# Patient Record
Sex: FEMALE | Race: WHITE | NOT HISPANIC OR LATINO | Employment: FULL TIME | URBAN - METROPOLITAN AREA
[De-identification: names, ages, dates, MRNs, and addresses within clinical notes are randomized per-mention and may not be internally consistent; named-entity substitution may affect disease eponyms.]

---

## 2017-03-15 ENCOUNTER — TELEPHONE (OUTPATIENT)
Dept: OBSTETRICS AND GYNECOLOGY | Facility: CLINIC | Age: 57
End: 2017-03-15

## 2017-03-15 DIAGNOSIS — Z12.31 ENCOUNTER FOR SCREENING MAMMOGRAM FOR BREAST CANCER: Primary | ICD-10-CM

## 2017-03-15 NOTE — TELEPHONE ENCOUNTER
----- Message from Cal Patel sent at 3/15/2017  1:55 PM CDT -----  Contact: Patient  x_ 1st Request  _ 2nd Request  _ 3rd Request    Who: AKSHAT ABBOTT [[id number]]    Why: Patient is calling in regards to her mmg order. Patient is needing a call back to confirm.    What Number to Call Back: Patient can be reached at [phone].    When to Expect a call back: (Before the end of the day)  -- if call after 3:00 call back will be tomorrow.

## 2017-03-23 ENCOUNTER — HOSPITAL ENCOUNTER (OUTPATIENT)
Dept: RADIOLOGY | Facility: HOSPITAL | Age: 57
Discharge: HOME | End: 2017-03-23
Attending: OBSTETRICS & GYNECOLOGY
Payer: COMMERCIAL

## 2017-03-23 DIAGNOSIS — Z12.31 ENCOUNTER FOR SCREENING MAMMOGRAM FOR BREAST CANCER: ICD-10-CM

## 2017-03-23 PROCEDURE — 77067 SCR MAMMO BI INCL CAD: CPT | Mod: 26,,, | Performed by: RADIOLOGY

## 2017-03-23 PROCEDURE — 77063 BREAST TOMOSYNTHESIS BI: CPT | Mod: 26,,, | Performed by: RADIOLOGY

## 2017-03-23 PROCEDURE — 77067 SCR MAMMO BI INCL CAD: CPT | Mod: TC

## 2017-04-22 DIAGNOSIS — N95.2 POSTMENOPAUSAL ATROPHIC VAGINITIS: ICD-10-CM

## 2017-04-22 DIAGNOSIS — Z78.0 POST-MENOPAUSAL: ICD-10-CM

## 2017-04-22 DIAGNOSIS — Z01.419 WELL WOMAN EXAM WITH ROUTINE GYNECOLOGICAL EXAM: ICD-10-CM

## 2017-04-22 DIAGNOSIS — R10.2 FEMALE PELVIC PAIN: ICD-10-CM

## 2017-04-24 RX ORDER — ESTRADIOL 10 UG/1
TABLET VAGINAL
Qty: 30 TABLET | Refills: 0 | Status: SHIPPED | OUTPATIENT
Start: 2017-04-24 | End: 2017-07-20 | Stop reason: SDUPTHER

## 2017-05-31 DIAGNOSIS — Z78.0 POST-MENOPAUSAL: ICD-10-CM

## 2017-05-31 DIAGNOSIS — Z01.419 WELL WOMAN EXAM WITH ROUTINE GYNECOLOGICAL EXAM: ICD-10-CM

## 2017-05-31 DIAGNOSIS — R10.2 FEMALE PELVIC PAIN: ICD-10-CM

## 2017-05-31 DIAGNOSIS — N95.2 POSTMENOPAUSAL ATROPHIC VAGINITIS: ICD-10-CM

## 2017-06-01 RX ORDER — ESTRADIOL 10 UG/1
TABLET VAGINAL
Qty: 180 TABLET | Refills: 0 | Status: SHIPPED | OUTPATIENT
Start: 2017-06-01 | End: 2017-07-20 | Stop reason: SDUPTHER

## 2017-06-26 ENCOUNTER — TELEPHONE (OUTPATIENT)
Dept: OBSTETRICS AND GYNECOLOGY | Facility: CLINIC | Age: 57
End: 2017-06-26

## 2017-06-26 NOTE — TELEPHONE ENCOUNTER
----- Message from Cal Patel sent at 6/26/2017  1:59 PM CDT -----  Contact: Patient  x 1st Request  _ 2nd Request  _ 3rd Request    Who: AKSHAT ABBOTT [[id number]]    Why: Patient is calling to schedule her annual appointment due to the schedule is exhausted. Patient is needing a call back    What Number to Call Back:  [phone]    When to Expect a call back: (Before the end of the day)  -- if call after 3:00 call back will be tomorrow.

## 2017-07-20 ENCOUNTER — OFFICE VISIT (OUTPATIENT)
Dept: OBSTETRICS AND GYNECOLOGY | Facility: CLINIC | Age: 57
End: 2017-07-20
Attending: OBSTETRICS & GYNECOLOGY
Payer: COMMERCIAL

## 2017-07-20 VITALS
BODY MASS INDEX: 24.13 KG/M2 | DIASTOLIC BLOOD PRESSURE: 72 MMHG | HEIGHT: 68 IN | WEIGHT: 159.19 LBS | SYSTOLIC BLOOD PRESSURE: 122 MMHG

## 2017-07-20 DIAGNOSIS — M85.80 OSTEOPENIA, UNSPECIFIED LOCATION: ICD-10-CM

## 2017-07-20 DIAGNOSIS — N95.2 POSTMENOPAUSAL ATROPHIC VAGINITIS: ICD-10-CM

## 2017-07-20 DIAGNOSIS — Z79.890 POSTMENOPAUSAL HORMONE REPLACEMENT THERAPY: ICD-10-CM

## 2017-07-20 DIAGNOSIS — R10.2 FEMALE PELVIC PAIN: ICD-10-CM

## 2017-07-20 DIAGNOSIS — Z01.419 WELL WOMAN EXAM WITH ROUTINE GYNECOLOGICAL EXAM: Primary | ICD-10-CM

## 2017-07-20 DIAGNOSIS — Z78.0 POST-MENOPAUSAL: ICD-10-CM

## 2017-07-20 PROCEDURE — 99396 PREV VISIT EST AGE 40-64: CPT | Mod: S$GLB,,, | Performed by: OBSTETRICS & GYNECOLOGY

## 2017-07-20 PROCEDURE — 99999 PR PBB SHADOW E&M-EST. PATIENT-LVL II: CPT | Mod: PBBFAC,,, | Performed by: OBSTETRICS & GYNECOLOGY

## 2017-07-20 RX ORDER — ESTRADIOL 10 UG/1
INSERT VAGINAL
Qty: 30 TABLET | Refills: 6 | Status: SHIPPED | OUTPATIENT
Start: 2017-07-20 | End: 2020-06-16

## 2017-07-20 NOTE — PROGRESS NOTES
Subjective:     Patient ID: Daksha Crabtree is a 56 y.o. female.     Chief Complaint: Gynecologic Exam     History of Present Illness: This patient is a 56 y.o. female, who presents to the GYN clinic for her gyn well woman yearly exam.  She denies gyn complaints.      No LMP recorded. Patient has had a hysterectomy.    Review of Systems    GENERAL: No fever, chills, fatigability or weightchange  SKIN: No rashes, itching or changes in color or texture of skin.  HEAD: No headaches or recent head trauma.  EYES: Visual acuity fine. No photophobia,r diplopia.  EARS: Denies earache or vertigo  NOSE: No loss of smell, no epistaxis or postnasal drip.  MOUTH & THROAT: No hoarseness or change in voice.   NODES: Denies swollen glands.  CHEST: Denies HOLT, cyanosis, wheezing, cough and sputum production.  CARDIOVASCULAR: Denies chest pain, PND, orthopnea or reduced exercise tolerance.  ABDOMEN: Appetite fine. No weight loss. bloating, Denies diarrhea, abdominal pain, hematemesis or blood in stool.  URINARY: No flank pain, dysuria or hematuria.  PERIPHERAL VASCULAR: No claudication or cyanosis.Varicosities  MUSCULOSKELETAL: No joint stiffness or swelling. Denies back pain.muscle aches  NEUROLOGIC: No history of seizures, paralysis, alteration of gait or coordination.       Objective:       Physical Exam     APPEARANCE: Well nourished, well developed, in no acute distress.    GENITOURINARY:  Vulva: No lesions. Normal female genital architecture.  Urethral Meatus: Normal size and location, no lesions, no prolapse.  Urethra: No masses, tenderness, prolapse or scarring.  Vagina: thin, atrophic and with decreased rugae, no discharge, no significant cystocele or rectocele.  Cervix: No lesions, normal diameter, no stenosis, no cervical motion tenderness. .  Uterus: 6 week size, regular shape, mobile, non-tender, normal position, good support.  Adnexa: No masses, tenderness or CDS nodularity.  Anus Perineum: No lesions, no relaxation,  no external hemorrhoids.  Breasts: Symmetrical, no skin changes or visible lesions. No palpable masses, nipple discharge or adenopathy bilaterally.  Abdomen: No masses, tenderness, hernia or ascites, no hepatasplenomegaly  Neck: Supple. Symmetric without masses. No thyromegaly.  Skin: No rashes, lesions, ulcers, acne, hirsutism.  Respiratory: Breath sounds clear bilateraly. Good air movement. No rales. No wheezes.  Cardiovascular: Normal rate. Regular rhythm.  Peripheral/lower extremities: No edema, erythema or tenderness.  Lymphatic: No axillary, neck or groin nodes palp.  Mental Status: Alert, oriented x 3, normal affect and mood.          @PROCEDURE:@           Assessment:      1. Well woman exam with routine gynecological exam    2. Post-menopausal    3. Postmenopausal atrophic vaginitis    4. Female pelvic pain    5. Osteopenia, unspecified location    6. Postmenopausal hormone replacement therapy               Plan:  1.  Bone density ordered.  2.  Discussed the pros, cons, risks, and benefits of vaginal estrogen hormone replacement therapy.  Patient wishes to continue her vaginal hormone therapy.  3.  Discussed new Pap smear recommendations patient's last Pap smear was 2016 her next Pap smear is due in 2019.  4.  Return to clinic for well woman exam,                    No orders of the defined types were placed in this encounter.

## 2018-06-04 ENCOUNTER — TELEPHONE (OUTPATIENT)
Dept: OBSTETRICS AND GYNECOLOGY | Facility: CLINIC | Age: 58
End: 2018-06-04

## 2018-06-04 DIAGNOSIS — Z12.31 VISIT FOR SCREENING MAMMOGRAM: Primary | ICD-10-CM

## 2018-06-04 NOTE — TELEPHONE ENCOUNTER
----- Message from Kimberly Clarke sent at 6/4/2018 10:00 AM CDT -----  Contact: Daksha   Name of Who is Calling: Daksha       What is the request in detail: Patient is requesting a call back concerning ordering a bone density and annual mammograms       Can the clinic reply by [facility]: No       What Number to Call Back if not in [facility]: [phone]

## 2018-06-04 NOTE — TELEPHONE ENCOUNTER
Spoke with patient, patient expressed that she wanted an order for a mammogram and a bone density test. I explained that I can put in the order for the bone density scan and once Dr. Farias signs it I can call her back and get her scheduled. Patient expressed understanding.

## 2018-06-26 ENCOUNTER — HOSPITAL ENCOUNTER (OUTPATIENT)
Dept: RADIOLOGY | Facility: HOSPITAL | Age: 58
Discharge: HOME | End: 2018-06-26
Attending: OBSTETRICS & GYNECOLOGY
Payer: COMMERCIAL

## 2018-06-26 DIAGNOSIS — Z12.31 VISIT FOR SCREENING MAMMOGRAM: ICD-10-CM

## 2018-06-26 PROCEDURE — 77063 BREAST TOMOSYNTHESIS BI: CPT | Mod: 26,,, | Performed by: RADIOLOGY

## 2018-06-26 PROCEDURE — 77067 SCR MAMMO BI INCL CAD: CPT | Mod: TC

## 2018-06-26 PROCEDURE — 77067 SCR MAMMO BI INCL CAD: CPT | Mod: 26,,, | Performed by: RADIOLOGY

## 2018-06-29 ENCOUNTER — HOSPITAL ENCOUNTER (OUTPATIENT)
Dept: RADIOLOGY | Facility: HOSPITAL | Age: 58
Discharge: HOME | End: 2018-06-29
Attending: OBSTETRICS & GYNECOLOGY
Payer: COMMERCIAL

## 2018-06-29 DIAGNOSIS — R92.8 ABNORMAL MAMMOGRAM: ICD-10-CM

## 2018-06-29 PROCEDURE — 77061 BREAST TOMOSYNTHESIS UNI: CPT | Mod: TC,PO,LT

## 2018-06-29 PROCEDURE — 76642 ULTRASOUND BREAST LIMITED: CPT | Mod: 26,LT,, | Performed by: RADIOLOGY

## 2018-06-29 PROCEDURE — 77061 BREAST TOMOSYNTHESIS UNI: CPT | Mod: 26,LT,, | Performed by: RADIOLOGY

## 2018-06-29 PROCEDURE — 76642 ULTRASOUND BREAST LIMITED: CPT | Mod: TC,PO,LT

## 2018-06-29 PROCEDURE — 77065 DX MAMMO INCL CAD UNI: CPT | Mod: TC,PO,LT

## 2018-06-29 PROCEDURE — 77065 DX MAMMO INCL CAD UNI: CPT | Mod: 26,LT,, | Performed by: RADIOLOGY

## 2018-08-16 ENCOUNTER — OFFICE VISIT (OUTPATIENT)
Dept: OBSTETRICS AND GYNECOLOGY | Facility: CLINIC | Age: 58
End: 2018-08-16
Attending: OBSTETRICS & GYNECOLOGY
Payer: COMMERCIAL

## 2018-08-16 ENCOUNTER — TELEPHONE (OUTPATIENT)
Dept: OBSTETRICS AND GYNECOLOGY | Facility: CLINIC | Age: 58
End: 2018-08-16

## 2018-08-16 VITALS
WEIGHT: 158.06 LBS | HEIGHT: 68 IN | SYSTOLIC BLOOD PRESSURE: 110 MMHG | DIASTOLIC BLOOD PRESSURE: 64 MMHG | BODY MASS INDEX: 23.95 KG/M2

## 2018-08-16 DIAGNOSIS — N94.10 FEMALE DYSPAREUNIA: ICD-10-CM

## 2018-08-16 DIAGNOSIS — M85.80 OSTEOPENIA, UNSPECIFIED LOCATION: ICD-10-CM

## 2018-08-16 DIAGNOSIS — Z78.0 POST-MENOPAUSAL: ICD-10-CM

## 2018-08-16 DIAGNOSIS — Z01.419 WELL WOMAN EXAM WITH ROUTINE GYNECOLOGICAL EXAM: Primary | ICD-10-CM

## 2018-08-16 DIAGNOSIS — Z79.890 POSTMENOPAUSAL HORMONE REPLACEMENT THERAPY: ICD-10-CM

## 2018-08-16 DIAGNOSIS — N95.2 POSTMENOPAUSAL ATROPHIC VAGINITIS: ICD-10-CM

## 2018-08-16 PROCEDURE — 99396 PREV VISIT EST AGE 40-64: CPT | Mod: S$GLB,,, | Performed by: OBSTETRICS & GYNECOLOGY

## 2018-08-16 PROCEDURE — 99999 PR PBB SHADOW E&M-EST. PATIENT-LVL III: CPT | Mod: PBBFAC,,, | Performed by: OBSTETRICS & GYNECOLOGY

## 2018-08-16 NOTE — PROGRESS NOTES
Subjective:     Patient ID: Daksha Crabtree is a 57 y.o. female.     Chief Complaint: Well Woman     History of Present Illness: This patient is a 57 y.o.  female, who presents to the GYN clinic for her gyn well woman yearly exam.  She denies gyn complaints.      No LMP recorded. Patient has had a hysterectomy.    Review of Systems    GENERAL: No fever, chills, fatigability or weightchange  SKIN: No rashes, itching or changes in color or texture of skin.  HEAD: No headaches or recent head trauma.  EYES: Visual acuity fine. No photophobia,r diplopia.  EARS: Denies earache or vertigo  NOSE: No loss of smell, no epistaxis or postnasal drip.  MOUTH & THROAT: No hoarseness or change in voice.   NODES: Denies swollen glands.  CHEST: Denies HOLT, cyanosis, wheezing, cough and sputum production.  CARDIOVASCULAR: Denies chest pain, PND, orthopnea or reduced exercise tolerance.  ABDOMEN: Appetite fine. No weight loss. bloating, Denies diarrhea, abdominal pain, hematemesis or blood in stool.  URINARY: No flank pain, dysuria or hematuria.  PERIPHERAL VASCULAR: No claudication or cyanosis.Varicosities  MUSCULOSKELETAL: No joint stiffness or swelling. Denies back pain.muscle aches  NEUROLOGIC: No history of seizures, paralysis, alteration of gait or coordination.       Objective:       Physical Exam     APPEARANCE: Well nourished, well developed, in no acute distress.    GENITOURINARY:  Vulva: No lesions. Normal female genital architecture.  Urethral Meatus: Normal size and location, no lesions, no prolapse.  Urethra: No masses, tenderness, prolapse or scarring.  Vagina: thin, atrophic and with decreased rugae, no discharge, no significant cystocele or rectocele.  Cervix: No lesions, normal diameter, no stenosis, no cervical motion tenderness.   Uterus: Absent  Adnexa: No masses, tenderness or CDS nodularity.  Anus Perineum: No lesions, no relaxation, no external hemorrhoids.  Breasts: Symmetrical, no skin changes or visible  lesions. No palpable masses, nipple discharge or adenopathy bilaterally. Bilateral implants noted.  Abdomen: No masses, tenderness, hernia or ascites, no hepatasplenomegaly  Neck: Supple. Symmetric without masses. No thyromegaly.  Skin: No rashes, lesions, ulcers, acne, hirsutism.  Respiratory: Breath sounds clear bilateraly. Good air movement. No rales. No wheezes.  Cardiovascular: Normal rate. Regular rhythm.  Peripheral/lower extremities: No edema, erythema or tenderness.  Lymphatic: No axillary, neck or groin nodes palp.  Mental Status: Alert, oriented x 3, normal affect and mood.    @PROCEDURE:@           Assessment:      1. Well woman exam with routine gynecological exam    2. Post-menopausal    3. Postmenopausal atrophic vaginitis    4. Postmenopausal vaginal hormone replacement therapy    5. Female dyspareunia    6. Osteopenia, unspecified location               Plan:  1.  Discussed the advantages of vaginal estrogen hormone replacement to help thicken the vaginal mucosa and decrease dyspareunia.  2.  No change in gyn regimen.  3.  Discussed Pap smear recommendations.  Needs Pap smear in 2019.  4.  Return to clinic for well-woman exam.              No orders of the defined types were placed in this encounter.

## 2018-08-16 NOTE — TELEPHONE ENCOUNTER
Pt states she may go back on using Yuvafem, ok to use daily for one week then go back to twice weekly per Dr Farias. Pt will have pharm call if refills needed.

## 2018-08-16 NOTE — TELEPHONE ENCOUNTER
----- Message from Debi Hernandez sent at 8/16/2018  4:42 PM CDT -----  Contact: self  Call back number is [phone]. She has some questions about some meds.

## 2018-08-31 ENCOUNTER — HOSPITAL ENCOUNTER (OUTPATIENT)
Dept: RADIOLOGY | Facility: CLINIC | Age: 58
Discharge: HOME | End: 2018-08-31
Attending: OBSTETRICS & GYNECOLOGY
Payer: COMMERCIAL

## 2018-08-31 DIAGNOSIS — Z78.0 POST-MENOPAUSAL: ICD-10-CM

## 2018-08-31 DIAGNOSIS — M85.80 OSTEOPENIA, UNSPECIFIED LOCATION: ICD-10-CM

## 2018-08-31 DIAGNOSIS — Z12.31 ENCOUNTER FOR SCREENING MAMMOGRAM FOR BREAST CANCER: ICD-10-CM

## 2018-08-31 PROCEDURE — 77080 DXA BONE DENSITY AXIAL: CPT | Mod: 26,,, | Performed by: INTERNAL MEDICINE

## 2018-08-31 PROCEDURE — 77080 DXA BONE DENSITY AXIAL: CPT | Mod: TC

## 2019-01-30 ENCOUNTER — TELEPHONE (OUTPATIENT)
Dept: OBSTETRICS AND GYNECOLOGY | Facility: CLINIC | Age: 59
End: 2019-01-30

## 2019-01-30 NOTE — TELEPHONE ENCOUNTER
"States having some swelling of left labia and feels a "bump" denies any pain, states just uncomfortable when touched. Explained Dr Farias not in clinic this pm, offered appt with another provider. States can not come in today, requesting appt Friday, appt scheduled  "

## 2019-01-30 NOTE — TELEPHONE ENCOUNTER
----- Message from Kimberly Clarke sent at 1/30/2019 12:19 PM CST -----  Contact: musa  Name of Who is Calling: musa      What is the request in detail: Patient is requesting a call back she states she is having a issue but would not discuss her issues       Can the clinic reply by [facility]: no      What Number to Call Back if not in [facility]: [phone]

## 2019-02-01 ENCOUNTER — OFFICE VISIT (OUTPATIENT)
Dept: OBSTETRICS AND GYNECOLOGY | Facility: CLINIC | Age: 59
End: 2019-02-01
Payer: COMMERCIAL

## 2019-02-01 VITALS
BODY MASS INDEX: 23.56 KG/M2 | HEIGHT: 68 IN | WEIGHT: 155.44 LBS | SYSTOLIC BLOOD PRESSURE: 106 MMHG | DIASTOLIC BLOOD PRESSURE: 70 MMHG

## 2019-02-01 DIAGNOSIS — N89.8 VAGINAL IRRITATION: Primary | ICD-10-CM

## 2019-02-01 PROCEDURE — 99213 PR OFFICE/OUTPT VISIT, EST, LEVL III, 20-29 MIN: ICD-10-PCS | Mod: S$GLB,,, | Performed by: OBSTETRICS & GYNECOLOGY

## 2019-02-01 PROCEDURE — 87510 GARDNER VAG DNA DIR PROBE: CPT

## 2019-02-01 PROCEDURE — 99213 OFFICE O/P EST LOW 20 MIN: CPT | Mod: S$GLB,,, | Performed by: OBSTETRICS & GYNECOLOGY

## 2019-02-01 PROCEDURE — 87480 CANDIDA DNA DIR PROBE: CPT

## 2019-02-01 PROCEDURE — 3008F PR BODY MASS INDEX (BMI) DOCUMENTED: ICD-10-PCS | Mod: CPTII,S$GLB,, | Performed by: OBSTETRICS & GYNECOLOGY

## 2019-02-01 PROCEDURE — 3008F BODY MASS INDEX DOCD: CPT | Mod: CPTII,S$GLB,, | Performed by: OBSTETRICS & GYNECOLOGY

## 2019-02-01 RX ORDER — AMOXICILLIN AND CLAVULANATE POTASSIUM 875; 125 MG/1; MG/1
TABLET, FILM COATED ORAL
Refills: 0 | COMMUNITY
Start: 2019-01-21 | End: 2019-08-22

## 2019-02-01 RX ORDER — FLUCONAZOLE 150 MG/1
150 TABLET ORAL ONCE
Qty: 1 TABLET | Refills: 0 | Status: SHIPPED | OUTPATIENT
Start: 2019-02-01 | End: 2019-02-01

## 2019-02-01 NOTE — PROGRESS NOTES
"CC: Vaginal irritation and "bump"    Daksha Crabtree is a 58 y.o. female  presents with complaint of vaginal irritation and "vaginal bump". She noticed this on Wednesday. She is still taking vaginal estrogen as prescribed.    Past Medical History:   Diagnosis Date    Breast disorder     Dyspareunia 3/6/2014     Past Surgical History:   Procedure Laterality Date    BREAST BIOPSY Left 2010    BREAST SURGERY Bilateral     CHOLECYSTECTOMY      HYSTERECTOMY      LASER LAPAROSCOPY      Supracervical hysterectomy N/A     cervix  no uterus    TONSILLECTOMY       Social History     Tobacco Use    Smoking status: Former Smoker   Substance Use Topics    Alcohol use: Yes     Comment: Soically    Drug use: No     Family History   Problem Relation Age of Onset    Colon cancer Paternal Grandfather     Colon cancer Maternal Grandmother     Breast cancer Mother     Breast cancer Maternal Aunt     Ovarian cancer Neg Hx      OB History    Para Term  AB Living   2 2 2     2   SAB TAB Ectopic Multiple Live Births           2      # Outcome Date GA Lbr Joon/2nd Weight Sex Delivery Anes PTL Lv   2 Term      Vag-Spont  N ALVA   1 Term      Vag-Spont  N ALVA          /70 (BP Location: Left arm, Patient Position: Sitting)   Ht 5' 8" (1.727 m)   Wt 70.5 kg (155 lb 6.8 oz)   BMI 23.63 kg/m²     ROS:  GENERAL: No fever, chills, fatigability or weight loss.  VULVAR: No pain, + lesion and + itching.  VAGINAL: No relaxation, no itching, no discharge, no abnormal bleeding and no lesions.  ABDOMEN: No abdominal pain. Denies nausea. Denies vomiting. No diarrhea. No constipation  BREAST: Denies pain. No lumps. No discharge.  URINARY: No incontinence, no nocturia, no frequency and no dysuria.  CARDIOVASCULAR: No chest pain. No shortness of breath. No leg cramps.  NEUROLOGICAL: No headaches. No vision changes.    PHYSICAL EXAM:  GEN: NAD  Resp: nl effort  Pelvic:  VULVA: irritated appearing vulva with no " "tenderness or lesions, inflammed LN palpated left vulva, .5mm  Skin: warm and dry  Psych: nl affect  Neuro: no focal deficits    ASSESSMENT and PLAN:    ICD-10-CM ICD-9-CM    1. Vaginal irritation N89.8 623.9 Vaginosis Screen by DNA Probe       -Rx given for Diflucan for suspected VVC. "Bump" appears to be lymphadenopathy due to inflammation. All questions answered.       FOLLOW UP: PRN lack of improvement.           "

## 2019-02-02 LAB
CANDIDA RRNA VAG QL PROBE: NEGATIVE
G VAGINALIS RRNA GENITAL QL PROBE: NEGATIVE
T VAGINALIS RRNA GENITAL QL PROBE: NEGATIVE

## 2019-02-04 ENCOUNTER — PATIENT MESSAGE (OUTPATIENT)
Dept: OBSTETRICS AND GYNECOLOGY | Facility: CLINIC | Age: 59
End: 2019-02-04

## 2019-03-19 ENCOUNTER — NUTRITION (OUTPATIENT)
Dept: NUTRITION | Facility: CLINIC | Age: 59
End: 2019-03-19

## 2019-03-19 DIAGNOSIS — Z71.3 NUTRITIONAL COUNSELING: Primary | ICD-10-CM

## 2019-03-19 PROCEDURE — 97802 PR MED NUTR THER, 1ST, INDIV, EA 15 MIN: ICD-10-PCS | Mod: CSM,S$GLB,, | Performed by: NUTRITIONIST

## 2019-03-19 PROCEDURE — 97802 MEDICAL NUTRITION INDIV IN: CPT | Mod: CSM,S$GLB,, | Performed by: NUTRITIONIST

## 2019-03-19 NOTE — PROGRESS NOTES
"Nutrition Assessment for Initial Medical Nutrition Therapy    Referring professional: self referral    Reason for MNT visit: Pt in for education and nutrition counseling regarding nutritional counseling for goal of body fat loss while doing the keto diet. Her goal weight is 145 lbs. Since she started keto 5 weeks ago, her cholesterol has increased.       ASSESSMENT    Age: 58 y.o.  Wt: 155 lbs  Wt Readings from Last 1 Encounters:   02/01/19 70.5 kg (155 lb 6.8 oz)     Ht: 5'8"  Ht Readings from Last 1 Encounters:   02/01/19 5' 8" (1.727 m)     BMI: 23.56  BMI Readings from Last 1 Encounters:   02/01/19 23.63 kg/m²       Clinical signs/symptoms: none to assess    Medical History:   Past Medical History:   Diagnosis Date    Breast disorder     Dyspareunia 3/6/2014     Problem List           Resolved    Postmenopausal atrophic vaginitis         Postmenopausal vaginal hormone replacement therapy         Osteopenia         Female dyspareunia               Medications:none  Supplements: Vitamin D + calcium (combined) twice a day; 1,000 mg Vitamin C; milk thistle; biotin; MVI centrum trudy    Food/medication interactions: Patient stated she isn't currently taking any meds    Food allergies  intolerances: NKFA    Labs:  Reviewed. Her last on 2/26/2019 from her doctor are as follows: Total chol: 244; HDL: 66; ;  and BS is 103.  No results found for: HGBA1C  Cholesterol   Date Value Ref Range Status   03/25/2009 169 120 - 199 mg/dL Final     Comment:     The National Cholesterol Education Program (NCEP) has set the  following guidelines (reference ranges) for Cholesterol:  Optimal.....................<200 mg/dL  Borderline High.............200-239 mg/dL  High........................> or = 240 mg/dL     03/16/2007 158 120 - 199 mg/dL Final     Comment:     The National Cholesterol Education Program (NCEP) has set the  following guidelines (reference ranges) for Cholesterol:  Desirable...................<200 " mg/dL  Borderline High.............200-239 mg/dL  High........................> or = 240 mg/dL       Triglycerides   Date Value Ref Range Status   03/25/2009 108 30 - 150 mg/dL Final     Comment:     The National Cholesterol Education Program (NCEP) has set the  following guidelines (reference values) for triglycerides:  Normal......................<150 mg/dL  Borderline High.............150-199 mg/dL  High........................200-499 mg/dL  Very High...................> or = 500 mg/dL     03/16/2007 122 30 - 150 mg/dL Final     Comment:     The National Cholesterol Education Program (NCEP) has set the  following guidelines (reference values) for triglycerides:  Normal......................<150 mg/dL  Borderline High.............150-199 mg/dL  High........................200-499 mg/dL  Very High...................> or = 500 mg/dL       HDL   Date Value Ref Range Status   03/25/2009 38 (L) 40 - 75 mg/dL Final     Comment:     The National Cholesterol Education Program (NCEP) has set the  following guidelines (reference values) for HDL Cholesterol:  Low...............<40  Optimal...........>60     03/16/2007 33 (L) 40 - 67 mg/dL Final     Comment:     The National Cholesterol Education Program (NCEP) has set the  following guidelines (reference values) for HDL Cholesterol:  Low...............<40  Optimal...........>60       LDL Cholesterol   Date Value Ref Range Status   03/25/2009 109.4 63 - 129 mg/dl Final     Comment:     The National Cholesterol Education Program (NCEP) has set the  following guidelines (reference values) for LDL Cholesterol:  Optimal.......................<130 mg/dL  Borderline High...............130-159 mg/dL  High..........................160-189 mg/dL  Very High.....................>190 mg/dL     03/16/2007 100.6 mg/dl Final     Comment:     The National Cholesterol Education Program (NCEP) has set the  following guidelines (reference values) for LDL  Cholesterol:  Desirable.....................<130 mg/dL  Borderline High...............130-159 mg/dL  High..........................> or = 160 mg/dL         Typical day recall: Reviewed during consult. She consumes a good bit of high fat meats, which can increase her LDL. She does great at home with keto, but we discovered hidden sugar/carbs for her meals at restaurants.   Beverages: Coffee: 1-2 cups per day with 1 tsp creamer; water: 8-10 (16.9 oz bottle water) per day.    Dining out: Regular, 6 or more times per week    Alcohol: nonsmoker; drinks 2-3 glasses of wine ~5 days per week    Lifestyle Influences  Support System: Her     Meal preparation/shopping: self, spouse    Current Activity Level: Exercises 5-7 days a week for one hour; 30 minutes of cardio and 30 minutes of light weights  Patient motivation, anticipated barriers, expected compliance: Patient is motivated and has verbalized understanding and intent to comply    DIAGNOSIS   Problem: Food knowledge deficit  Etiology: related to eating more than rec higher animal based fat meats + hidden calories at restaurants  Signs/Symptoms: as evidence by 24 hour recall    INTERVENTION  Nutrition prescription: estimated energy requirements:   1500 calories  130 grams protein  ~30 grams carbohydrates   95 grams heart healthy fats  Fluid: 75 oz + sweat loss      Recommendations & Goals:  Patient goals and recommendations are tailored to the specific patient's needs, readiness to change, lifestyle, culture, skills, resources, & abilities. Strategies to help achieve these nutrition-related goals were discussed which can include but are not limited to SMART goal setting & mindful eating.     Aim for a minimum of 7 hours sleep   Exercise 60 minutes most days  Eat breakfast within 1-2 hours of waking up  Try not to skip any meals or snacks, not going more than 3-4 hours without eating.   At each meal and snack, try to include a source of fiber + lean protein +  healthy fat.   Consider obtaining 15-20 grams of carbohydrates per meal and snack with the exception of dinner time.     Written Materials Provided  These resources are intended to assist the patient in making it easier to choose recommended options when eating out & to identify better-for-you brands at the grocery store:     Meal Planning Guide with recommendations discussed along with portion sizes and a customized meal plan    Eat Fit jessica card as a reminder to download the jessica to ones smart phone which provides: current Eat Fit partners, approved menu options, food labels for carb counting, & recipes    Fast Food Lite Guide   Eat Fit Grocery Product list    RD contact information- for patient to contact regarding any questions, needs, and/or concerns that may arise     MONITORING & EVALUATION    Communicated with healthcare provider: n/a    Documented plan for referral to appropriate agency/healthcare provider as needed: n/a    Comprehension: fair     Motivation to change: high    Follow-up: 2 weeks  Counseling time: 2 Hours

## 2019-04-09 ENCOUNTER — NUTRITION (OUTPATIENT)
Dept: NUTRITION | Facility: CLINIC | Age: 59
End: 2019-04-09

## 2019-04-09 DIAGNOSIS — Z71.3 NUTRITIONAL COUNSELING: Primary | ICD-10-CM

## 2019-04-09 PROCEDURE — 99499 NO LOS: ICD-10-PCS | Mod: CSM,S$GLB,, | Performed by: NUTRITIONIST

## 2019-04-09 PROCEDURE — 99499 UNLISTED E&M SERVICE: CPT | Mod: CSM,S$GLB,, | Performed by: NUTRITIONIST

## 2019-04-09 NOTE — PROGRESS NOTES
"Nutrition Assessment for Initial Medical Nutrition Therapy     Referring professional: self referral     Reason for MNT visit: Pt in for education and nutrition counseling regarding nutritional counseling for goal of body fat loss while doing the keto diet. I met with Daksha 3 weeks ago. Her clothes are fitting looser and is happy with her meal plan. I recommended a few new products.       ASSESSMENT     Age: 58 y.o.  Wt: ~150 pounds.       Wt Readings from Last 1 Encounters:   02/01/19 70.5 kg (155 lb 6.8 oz)      Ht: 5'8"      Ht Readings from Last 1 Encounters:   02/01/19 5' 8" (1.727 m)      BMI: 22.7      BMI Readings from Last 1 Encounters:   02/01/19 23.63 kg/m²         Clinical signs/symptoms: none to assess     Medical History:        Past Medical History:   Diagnosis Date    Breast disorder      Dyspareunia 3/6/2014              Problem List            Resolved     Postmenopausal atrophic vaginitis             Postmenopausal vaginal hormone replacement therapy             Osteopenia             Female dyspareunia                     Medications:none  Supplements: Vitamin D + calcium (combined) twice a day; 1,000 mg Vitamin C; milk thistle; biotin; MVI centrum trudy     Food/medication interactions: Patient stated she isn't currently taking any meds     Food allergies  intolerances: NKFA     Labs:  Reviewed. Her last on 2/26/2019 from her doctor are as follows: Total chol: 244; HDL: 66; ;  and BS is 103.  No results found for: HGBA1C          Cholesterol   Date Value Ref Range Status   03/25/2009 169 120 - 199 mg/dL Final       Comment:       The National Cholesterol Education Program (NCEP) has set the  following guidelines (reference ranges) for Cholesterol:  Optimal.....................<200 mg/dL  Borderline High.............200-239 mg/dL  High........................> or = 240 mg/dL      03/16/2007 158 120 - 199 mg/dL Final       Comment:       The National Cholesterol Education Program " (NCEP) has set the  following guidelines (reference ranges) for Cholesterol:  Desirable...................<200 mg/dL  Borderline High.............200-239 mg/dL  High........................> or = 240 mg/dL                 Triglycerides   Date Value Ref Range Status   03/25/2009 108 30 - 150 mg/dL Final       Comment:       The National Cholesterol Education Program (NCEP) has set the  following guidelines (reference values) for triglycerides:  Normal......................<150 mg/dL  Borderline High.............150-199 mg/dL  High........................200-499 mg/dL  Very High...................> or = 500 mg/dL      03/16/2007 122 30 - 150 mg/dL Final       Comment:       The National Cholesterol Education Program (NCEP) has set the  following guidelines (reference values) for triglycerides:  Normal......................<150 mg/dL  Borderline High.............150-199 mg/dL  High........................200-499 mg/dL  Very High...................> or = 500 mg/dL                 HDL   Date Value Ref Range Status   03/25/2009 38 (L) 40 - 75 mg/dL Final       Comment:       The National Cholesterol Education Program (NCEP) has set the  following guidelines (reference values) for HDL Cholesterol:  Low...............<40  Optimal...........>60      03/16/2007 33 (L) 40 - 67 mg/dL Final       Comment:       The National Cholesterol Education Program (NCEP) has set the  following guidelines (reference values) for HDL Cholesterol:  Low...............<40  Optimal...........>60                 LDL Cholesterol   Date Value Ref Range Status   03/25/2009 109.4 63 - 129 mg/dl Final       Comment:       The National Cholesterol Education Program (NCEP) has set the  following guidelines (reference values) for LDL Cholesterol:  Optimal.......................<130 mg/dL  Borderline High...............130-159 mg/dL  High..........................160-189 mg/dL  Very High.....................>190 mg/dL      03/16/2007 100.6 mg/dl Final        Comment:       The National Cholesterol Education Program (NCEP) has set the  following guidelines (reference values) for LDL Cholesterol:  Desirable.....................<130 mg/dL  Borderline High...............130-159 mg/dL  High..........................> or = 160 mg/dL            Typical day recall: Reviewed during consult. She consumes a good bit of high fat meats, which can increase her LDL. She does great at home with keto, but we discovered hidden sugar/carbs for her meals at restaurants.   Beverages: Coffee: 1-2 cups per day with 1 tsp creamer; water: 8-10 (16.9 oz bottle water) per day.     Dining out: Regular, 6 or more times per week     Alcohol: nonsmoker; drinks 2-3 glasses of wine ~5 days per week     Lifestyle Influences  Support System: Her      Meal preparation/shopping: self, spouse     Current Activity Level: Exercises 5-7 days a week for one hour; 30 minutes of cardio and 30 minutes of light weights  Patient motivation, anticipated barriers, expected compliance: Patient is motivated and has verbalized understanding and intent to comply     DIAGNOSIS   Problem: Food knowledge deficit  Etiology: related to eating more than rec higher animal based fat meats + hidden calories at restaurants  Signs/Symptoms: as evidence by 24 hour recall     INTERVENTION  Nutrition prescription: estimated energy requirements:   1500 calories  130 grams protein  ~30 grams carbohydrates   95 grams heart healthy fats  Fluid: 75 oz + sweat loss        Recommendations & Goals:  Patient goals and recommendations are tailored to the specific patient's needs, readiness to change, lifestyle, culture, skills, resources, & abilities. Strategies to help achieve these nutrition-related goals were discussed which can include but are not limited to SMART goal setting & mindful eating.                 Aim for a minimum of 7 hours sleep              Exercise 60 minutes most days  Eat breakfast within 1-2 hours of waking  up  Try not to skip any meals or snacks, not going more than 3-4 hours without eating.   At each meal and snack, try to include a source of fiber + lean protein + healthy fat.   Consider obtaining 15-20 grams of carbohydrates per meal and snack with the exception of dinner time.      Written Materials Provided  These resources are intended to assist the patient in making it easier to choose recommended options when eating out & to identify better-for-you brands at the grocery store:     · Meal Planning Guide with recommendations discussed along with portion sizes and a customized meal plan   · Eat Fit jessica card as a reminder to download the jessica to ones smart phone which provides: current Eat Fit partners, approved menu options, food labels for carb counting, & recipes   · Fast Food Lite Guide  · Eat [employer] Grocery Product list   · RD contact information- for patient to contact regarding any questions, needs, and/or concerns that may arise      MONITORING & EVALUATION     Communicated with healthcare provider: n/a     Documented plan for referral to appropriate agency/healthcare provider as needed: n/a     Comprehension: fair      Motivation to change: high     Follow-up: 2 weeks  Counseling time: 2 Hours

## 2019-07-09 ENCOUNTER — TELEPHONE (OUTPATIENT)
Dept: OBSTETRICS AND GYNECOLOGY | Facility: CLINIC | Age: 59
End: 2019-07-09

## 2019-07-09 DIAGNOSIS — Z12.31 SCREENING MAMMOGRAM, ENCOUNTER FOR: Primary | ICD-10-CM

## 2019-07-23 ENCOUNTER — HOSPITAL ENCOUNTER (OUTPATIENT)
Dept: RADIOLOGY | Facility: HOSPITAL | Age: 59
Discharge: HOME | End: 2019-07-23
Attending: OBSTETRICS & GYNECOLOGY
Payer: COMMERCIAL

## 2019-07-23 DIAGNOSIS — Z12.31 SCREENING MAMMOGRAM, ENCOUNTER FOR: ICD-10-CM

## 2019-07-23 PROCEDURE — 77067 MAMMO DIGITAL SCREENING BILAT WITH TOMOSYNTHESIS_CAD: ICD-10-PCS | Mod: 26,,, | Performed by: RADIOLOGY

## 2019-07-23 PROCEDURE — 77067 SCR MAMMO BI INCL CAD: CPT | Mod: 26,,, | Performed by: RADIOLOGY

## 2019-07-23 PROCEDURE — 77067 SCR MAMMO BI INCL CAD: CPT | Mod: TC

## 2019-07-23 PROCEDURE — 77063 BREAST TOMOSYNTHESIS BI: CPT | Mod: 26,,, | Performed by: RADIOLOGY

## 2019-07-23 PROCEDURE — 77063 MAMMO DIGITAL SCREENING BILAT WITH TOMOSYNTHESIS_CAD: ICD-10-PCS | Mod: 26,,, | Performed by: RADIOLOGY

## 2019-08-22 ENCOUNTER — OFFICE VISIT (OUTPATIENT)
Dept: OBSTETRICS AND GYNECOLOGY | Facility: CLINIC | Age: 59
End: 2019-08-22
Attending: OBSTETRICS & GYNECOLOGY
Payer: COMMERCIAL

## 2019-08-22 VITALS
WEIGHT: 149.5 LBS | DIASTOLIC BLOOD PRESSURE: 62 MMHG | HEIGHT: 68 IN | SYSTOLIC BLOOD PRESSURE: 116 MMHG | BODY MASS INDEX: 22.66 KG/M2

## 2019-08-22 DIAGNOSIS — Z79.890 POSTMENOPAUSAL HORMONE REPLACEMENT THERAPY: ICD-10-CM

## 2019-08-22 DIAGNOSIS — Z12.31 SCREENING MAMMOGRAM, ENCOUNTER FOR: ICD-10-CM

## 2019-08-22 DIAGNOSIS — N95.2 POSTMENOPAUSAL ATROPHIC VAGINITIS: ICD-10-CM

## 2019-08-22 DIAGNOSIS — Z01.419 WELL WOMAN EXAM WITH ROUTINE GYNECOLOGICAL EXAM: Primary | ICD-10-CM

## 2019-08-22 DIAGNOSIS — Z78.0 POST-MENOPAUSAL: ICD-10-CM

## 2019-08-22 PROCEDURE — 99999 PR PBB SHADOW E&M-EST. PATIENT-LVL III: CPT | Mod: PBBFAC,,, | Performed by: OBSTETRICS & GYNECOLOGY

## 2019-08-22 PROCEDURE — 99999 PR PBB SHADOW E&M-EST. PATIENT-LVL III: ICD-10-PCS | Mod: PBBFAC,,, | Performed by: OBSTETRICS & GYNECOLOGY

## 2019-08-22 PROCEDURE — 99396 PR PREVENTIVE VISIT,EST,40-64: ICD-10-PCS | Mod: S$GLB,,, | Performed by: OBSTETRICS & GYNECOLOGY

## 2019-08-22 PROCEDURE — 88142 CYTOPATH C/V THIN LAYER: CPT

## 2019-08-22 PROCEDURE — 99396 PREV VISIT EST AGE 40-64: CPT | Mod: S$GLB,,, | Performed by: OBSTETRICS & GYNECOLOGY

## 2019-08-22 RX ORDER — ESTRADIOL 10 UG/1
INSERT VAGINAL
COMMUNITY
Start: 2018-12-12 | End: 2019-08-22 | Stop reason: SDUPTHER

## 2019-08-22 RX ORDER — ESTRADIOL 10 UG/1
INSERT VAGINAL
Qty: 30 TABLET | Refills: 6 | Status: SHIPPED | OUTPATIENT
Start: 2019-08-22 | End: 2019-12-18 | Stop reason: SDUPTHER

## 2019-08-22 RX ORDER — LORAZEPAM 0.5 MG/1
TABLET ORAL
Refills: 3 | COMMUNITY
Start: 2019-06-03 | End: 2022-09-01

## 2019-08-22 RX ORDER — LORAZEPAM 0.5 MG/1
0.5 TABLET ORAL
COMMUNITY
Start: 2019-02-25

## 2019-08-22 RX ORDER — FLUOROURACIL 50 MG/G
CREAM TOPICAL
COMMUNITY
Start: 2019-08-15 | End: 2022-09-01

## 2019-08-22 NOTE — PROGRESS NOTES
Subjective:     Patient ID: Daksha Crabtree is a 58 y.o. female.     Chief Complaint: Well Woman     History of Present Illness: This patient is a 58 y.o.  female, who presents to the GYN clinic for her gyn well woman yearly exam.  She continues to use vaginal estrogen hormone replacement therapy. She denies gyn complaints      No LMP recorded. Patient has had a hysterectomy.    Review of Systems    GENERAL: No fever, chills, fatigability or weightchange  SKIN: No rashes, itching or changes in color or texture of skin.  HEAD: No headaches or recent head trauma.  EYES: Visual acuity fine. No photophobia,r diplopia.  EARS: Denies earache or vertigo  NOSE: No loss of smell, no epistaxis or postnasal drip.  MOUTH & THROAT: No hoarseness or change in voice.   NODES: Denies swollen glands.  CHEST: Denies HOLT, cyanosis, wheezing, cough and sputum production.  CARDIOVASCULAR: Denies chest pain, PND, orthopnea or reduced exercise tolerance.  ABDOMEN: Appetite fine. No weight loss. bloating, Denies diarrhea, abdominal pain, hematemesis or blood in stool.  URINARY: No flank pain, dysuria or hematuria.  PERIPHERAL VASCULAR: No claudication or cyanosis.Varicosities  MUSCULOSKELETAL: No joint stiffness or swelling. Denies back pain.muscle aches  NEUROLOGIC: No history of seizures, paralysis, alteration of gait or coordination.       Objective:       Physical Exam     APPEARANCE: Well nourished, well developed, in no acute distress.    GENITOURINARY:  Vulva: No lesions. Normal female genital architecture.  Urethral Meatus: Normal size and location, no lesions, no prolapse.  Urethra: No masses, tenderness, prolapse or scarring.  Vagina: thin, atrophic and with decreased rugae, no discharge, no significant cystocele or rectocele.  Cervix:  No lesions, normal diameter, no stenosis noted, no cervical motion tenderness.  Uterus: Absent  Adnexa: No masses, tenderness or CDS nodularity.  Anus Perineum: No lesions, no relaxation, no  external hemorrhoids.  Breasts: Symmetrical, no skin changes or visible lesions. No palpable masses, nipple discharge or adenopathy bilaterally.  Abdomen: No masses, tenderness, hernia or ascites, no hepatasplenomegaly  Neck: Supple. Symmetric without masses. No thyromegaly.  Skin: No rashes, lesions, ulcers, acne, hirsutism.  Respiratory: Breath sounds clear bilateraly. Good air movement. No rales. No wheezes.  Cardiovascular: Normal rate. Regular rhythm.  Peripheral/lower extremities: No edema, erythema or tenderness.  Lymphatic: No axillary, neck or groin nodes palp.  Mental Status: Alert, oriented x 3, normal affect and mood.    @PROCEDURE:@           Assessment:      1. Well woman exam with routine gynecological exam    2. Post-menopausal    3. Postmenopausal atrophic vaginitis    4. Postmenopausal vaginal hormone replacement therapy    5. Screening mammogram, encounter for               Plan:  1.  Discussed the need for the patient to contact the breast center to schedule further testing because of an elevated TC score.  2.  No change in gyn regimen.  3.  Return to clinic for well-woman exam.                No orders of the defined types were placed in this encounter.

## 2019-08-27 ENCOUNTER — TELEPHONE (OUTPATIENT)
Dept: OBSTETRICS AND GYNECOLOGY | Facility: CLINIC | Age: 59
End: 2019-08-27

## 2019-08-27 DIAGNOSIS — Z91.89 AT HIGH RISK FOR BREAST CANCER: Primary | ICD-10-CM

## 2019-08-27 NOTE — TELEPHONE ENCOUNTER
Pt notified vaginal estrogen no longer covered by insurance/received info from pharm. Pt can try [facility] for pricing options and call back if need to resubmit rx to a different pharm

## 2019-11-12 DIAGNOSIS — Z01.419 WELL WOMAN EXAM WITH ROUTINE GYNECOLOGICAL EXAM: ICD-10-CM

## 2019-11-12 DIAGNOSIS — Z78.0 POST-MENOPAUSAL: ICD-10-CM

## 2019-11-12 DIAGNOSIS — N95.2 POSTMENOPAUSAL ATROPHIC VAGINITIS: ICD-10-CM

## 2019-11-12 DIAGNOSIS — Z79.890 POSTMENOPAUSAL HORMONE REPLACEMENT THERAPY: ICD-10-CM

## 2019-11-12 DIAGNOSIS — Z12.31 SCREENING MAMMOGRAM, ENCOUNTER FOR: ICD-10-CM

## 2019-11-15 RX ORDER — ESTRADIOL 10 UG/1
INSERT VAGINAL
Qty: 8 TABLET | Refills: 0 | OUTPATIENT
Start: 2019-11-15

## 2019-12-18 DIAGNOSIS — Z01.419 WELL WOMAN EXAM WITH ROUTINE GYNECOLOGICAL EXAM: ICD-10-CM

## 2019-12-18 DIAGNOSIS — Z12.31 SCREENING MAMMOGRAM, ENCOUNTER FOR: ICD-10-CM

## 2019-12-18 DIAGNOSIS — Z78.0 POST-MENOPAUSAL: ICD-10-CM

## 2019-12-18 DIAGNOSIS — N95.2 POSTMENOPAUSAL ATROPHIC VAGINITIS: ICD-10-CM

## 2019-12-18 DIAGNOSIS — Z79.890 POSTMENOPAUSAL HORMONE REPLACEMENT THERAPY: ICD-10-CM

## 2019-12-18 RX ORDER — ESTRADIOL 10 UG/1
INSERT VAGINAL
Qty: 30 TABLET | Refills: 0 | Status: SHIPPED | OUTPATIENT
Start: 2019-12-18 | End: 2020-06-16

## 2020-06-16 ENCOUNTER — DOCUMENTATION ONLY (OUTPATIENT)
Dept: OBSTETRICS AND GYNECOLOGY | Facility: CLINIC | Age: 60
End: 2020-06-16

## 2020-06-16 RX ORDER — ESTRADIOL 0.1 MG/G
CREAM VAGINAL
Qty: 42.5 G | Refills: 3 | Status: SHIPPED | OUTPATIENT
Start: 2020-06-16 | End: 2022-09-01

## 2020-06-16 NOTE — PROGRESS NOTES
Received message from pharm, vagifem/yuvafem not covered by insurance. Requesting substitution to estradiol cream. New order sent to pharm

## 2020-07-30 ENCOUNTER — TELEPHONE (OUTPATIENT)
Dept: OBSTETRICS AND GYNECOLOGY | Facility: CLINIC | Age: 60
End: 2020-07-30

## 2020-07-30 DIAGNOSIS — Z12.31 SCREENING MAMMOGRAM, ENCOUNTER FOR: Primary | ICD-10-CM

## 2020-08-06 ENCOUNTER — HOSPITAL ENCOUNTER (OUTPATIENT)
Dept: RADIOLOGY | Facility: HOSPITAL | Age: 60
Discharge: HOME | End: 2020-08-06
Attending: OBSTETRICS & GYNECOLOGY
Payer: COMMERCIAL

## 2020-08-06 DIAGNOSIS — Z12.31 SCREENING MAMMOGRAM, ENCOUNTER FOR: ICD-10-CM

## 2020-08-06 PROCEDURE — 77063 MAMMO DIGITAL SCREENING BILAT WITH TOMOSYNTHESIS_CAD: ICD-10-PCS | Mod: 26,,, | Performed by: RADIOLOGY

## 2020-08-06 PROCEDURE — 77067 SCR MAMMO BI INCL CAD: CPT | Mod: 26,,, | Performed by: RADIOLOGY

## 2020-08-06 PROCEDURE — 77063 BREAST TOMOSYNTHESIS BI: CPT | Mod: 26,,, | Performed by: RADIOLOGY

## 2020-08-06 PROCEDURE — 77067 MAMMO DIGITAL SCREENING BILAT WITH TOMOSYNTHESIS_CAD: ICD-10-PCS | Mod: 26,,, | Performed by: RADIOLOGY

## 2020-08-06 PROCEDURE — 77067 SCR MAMMO BI INCL CAD: CPT | Mod: TC

## 2020-10-21 ENCOUNTER — CLINICAL SUPPORT (OUTPATIENT)
Dept: OTHER | Facility: CLINIC | Age: 60
End: 2020-10-21
Payer: COMMERCIAL

## 2020-10-21 DIAGNOSIS — Z00.8 ENCOUNTER FOR OTHER GENERAL EXAMINATION: ICD-10-CM

## 2020-10-22 VITALS — BODY MASS INDEX: 22.73 KG/M2 | HEIGHT: 68 IN

## 2020-10-22 LAB
HDLC SERPL-MCNC: 65 MG/DL
POC CHOLESTEROL, LDL (DOCK): 143 MG/DL
POC CHOLESTEROL, TOTAL: 227 MG/DL
POC GLUCOSE, FASTING: 88 MG/DL (ref 60–110)
TRIGL SERPL-MCNC: 97 MG/DL

## 2021-07-15 ENCOUNTER — TELEPHONE (OUTPATIENT)
Dept: OBSTETRICS AND GYNECOLOGY | Facility: CLINIC | Age: 61
End: 2021-07-15

## 2021-07-15 ENCOUNTER — PATIENT MESSAGE (OUTPATIENT)
Dept: OBSTETRICS AND GYNECOLOGY | Facility: CLINIC | Age: 61
End: 2021-07-15

## 2021-07-15 DIAGNOSIS — Z12.31 SCREENING MAMMOGRAM, ENCOUNTER FOR: Primary | ICD-10-CM

## 2021-08-09 ENCOUNTER — HOSPITAL ENCOUNTER (OUTPATIENT)
Dept: RADIOLOGY | Facility: HOSPITAL | Age: 61
Discharge: HOME | End: 2021-08-09
Attending: OBSTETRICS & GYNECOLOGY
Payer: COMMERCIAL

## 2021-08-09 VITALS — HEIGHT: 68 IN | BODY MASS INDEX: 23.04 KG/M2 | WEIGHT: 152 LBS

## 2021-08-09 DIAGNOSIS — Z12.31 SCREENING MAMMOGRAM, ENCOUNTER FOR: ICD-10-CM

## 2021-08-09 PROCEDURE — 77063 MAMMO DIGITAL SCREENING BILAT WITH TOMO: ICD-10-PCS | Mod: 26,,, | Performed by: RADIOLOGY

## 2021-08-09 PROCEDURE — 77063 BREAST TOMOSYNTHESIS BI: CPT | Mod: 26,,, | Performed by: RADIOLOGY

## 2021-08-09 PROCEDURE — 77067 MAMMO DIGITAL SCREENING BILAT WITH TOMO: ICD-10-PCS | Mod: 26,,, | Performed by: RADIOLOGY

## 2021-08-09 PROCEDURE — 77067 SCR MAMMO BI INCL CAD: CPT | Mod: TC

## 2021-08-09 PROCEDURE — 77067 SCR MAMMO BI INCL CAD: CPT | Mod: 26,,, | Performed by: RADIOLOGY

## 2021-08-25 ENCOUNTER — OFFICE VISIT (OUTPATIENT)
Dept: OBSTETRICS AND GYNECOLOGY | Facility: CLINIC | Age: 61
End: 2021-08-25
Attending: OBSTETRICS & GYNECOLOGY
Payer: COMMERCIAL

## 2021-08-25 VITALS
SYSTOLIC BLOOD PRESSURE: 126 MMHG | DIASTOLIC BLOOD PRESSURE: 78 MMHG | WEIGHT: 155.88 LBS | HEIGHT: 68 IN | BODY MASS INDEX: 23.63 KG/M2

## 2021-08-25 DIAGNOSIS — Z78.0 POST-MENOPAUSAL: ICD-10-CM

## 2021-08-25 DIAGNOSIS — N95.2 POSTMENOPAUSAL ATROPHIC VAGINITIS: ICD-10-CM

## 2021-08-25 DIAGNOSIS — Z12.31 SCREENING MAMMOGRAM, ENCOUNTER FOR: ICD-10-CM

## 2021-08-25 DIAGNOSIS — Z01.419 WELL WOMAN EXAM WITH ROUTINE GYNECOLOGICAL EXAM: Primary | ICD-10-CM

## 2021-08-25 PROCEDURE — 1126F PR PAIN SEVERITY QUANTIFIED, NO PAIN PRESENT: ICD-10-PCS | Mod: CPTII,S$GLB,, | Performed by: OBSTETRICS & GYNECOLOGY

## 2021-08-25 PROCEDURE — 1126F AMNT PAIN NOTED NONE PRSNT: CPT | Mod: CPTII,S$GLB,, | Performed by: OBSTETRICS & GYNECOLOGY

## 2021-08-25 PROCEDURE — 1159F PR MEDICATION LIST DOCUMENTED IN MEDICAL RECORD: ICD-10-PCS | Mod: CPTII,S$GLB,, | Performed by: OBSTETRICS & GYNECOLOGY

## 2021-08-25 PROCEDURE — 3074F PR MOST RECENT SYSTOLIC BLOOD PRESSURE < 130 MM HG: ICD-10-PCS | Mod: CPTII,S$GLB,, | Performed by: OBSTETRICS & GYNECOLOGY

## 2021-08-25 PROCEDURE — 99396 PREV VISIT EST AGE 40-64: CPT | Mod: S$GLB,,, | Performed by: OBSTETRICS & GYNECOLOGY

## 2021-08-25 PROCEDURE — 1160F RVW MEDS BY RX/DR IN RCRD: CPT | Mod: CPTII,S$GLB,, | Performed by: OBSTETRICS & GYNECOLOGY

## 2021-08-25 PROCEDURE — 99999 PR PBB SHADOW E&M-EST. PATIENT-LVL III: ICD-10-PCS | Mod: PBBFAC,,, | Performed by: OBSTETRICS & GYNECOLOGY

## 2021-08-25 PROCEDURE — 3074F SYST BP LT 130 MM HG: CPT | Mod: CPTII,S$GLB,, | Performed by: OBSTETRICS & GYNECOLOGY

## 2021-08-25 PROCEDURE — 99999 PR PBB SHADOW E&M-EST. PATIENT-LVL III: CPT | Mod: PBBFAC,,, | Performed by: OBSTETRICS & GYNECOLOGY

## 2021-08-25 PROCEDURE — 3078F DIAST BP <80 MM HG: CPT | Mod: CPTII,S$GLB,, | Performed by: OBSTETRICS & GYNECOLOGY

## 2021-08-25 PROCEDURE — 3008F BODY MASS INDEX DOCD: CPT | Mod: CPTII,S$GLB,, | Performed by: OBSTETRICS & GYNECOLOGY

## 2021-08-25 PROCEDURE — 1160F PR REVIEW ALL MEDS BY PRESCRIBER/CLIN PHARMACIST DOCUMENTED: ICD-10-PCS | Mod: CPTII,S$GLB,, | Performed by: OBSTETRICS & GYNECOLOGY

## 2021-08-25 PROCEDURE — 1159F MED LIST DOCD IN RCRD: CPT | Mod: CPTII,S$GLB,, | Performed by: OBSTETRICS & GYNECOLOGY

## 2021-08-25 PROCEDURE — 3008F PR BODY MASS INDEX (BMI) DOCUMENTED: ICD-10-PCS | Mod: CPTII,S$GLB,, | Performed by: OBSTETRICS & GYNECOLOGY

## 2021-08-25 PROCEDURE — 3078F PR MOST RECENT DIASTOLIC BLOOD PRESSURE < 80 MM HG: ICD-10-PCS | Mod: CPTII,S$GLB,, | Performed by: OBSTETRICS & GYNECOLOGY

## 2021-08-25 PROCEDURE — 99396 PR PREVENTIVE VISIT,EST,40-64: ICD-10-PCS | Mod: S$GLB,,, | Performed by: OBSTETRICS & GYNECOLOGY

## 2021-12-15 ENCOUNTER — CLINICAL SUPPORT (OUTPATIENT)
Dept: OTHER | Facility: CLINIC | Age: 61
End: 2021-12-15

## 2021-12-15 DIAGNOSIS — Z00.8 ENCOUNTER FOR OTHER GENERAL EXAMINATION: ICD-10-CM

## 2021-12-16 VITALS — HEIGHT: 68 IN | BODY MASS INDEX: 23.7 KG/M2

## 2021-12-16 LAB
GLUCOSE SERPL-MCNC: 93 MG/DL (ref 60–140)
HDLC SERPL-MCNC: 61 MG/DL
POC CHOLESTEROL, LDL (DOCK): 132 MG/DL
POC CHOLESTEROL, TOTAL: 217 MG/DL
TRIGL SERPL-MCNC: 120 MG/DL

## 2022-08-29 ENCOUNTER — PATIENT MESSAGE (OUTPATIENT)
Dept: OBSTETRICS AND GYNECOLOGY | Facility: CLINIC | Age: 62
End: 2022-08-29
Payer: COMMERCIAL

## 2022-08-29 DIAGNOSIS — Z12.31 SCREENING MAMMOGRAM, ENCOUNTER FOR: Primary | ICD-10-CM

## 2022-09-01 ENCOUNTER — OFFICE VISIT (OUTPATIENT)
Dept: OBSTETRICS AND GYNECOLOGY | Facility: CLINIC | Age: 62
End: 2022-09-01
Payer: COMMERCIAL

## 2022-09-01 VITALS
SYSTOLIC BLOOD PRESSURE: 118 MMHG | WEIGHT: 151.44 LBS | BODY MASS INDEX: 22.95 KG/M2 | HEIGHT: 68 IN | DIASTOLIC BLOOD PRESSURE: 80 MMHG

## 2022-09-01 DIAGNOSIS — N95.2 POSTMENOPAUSAL ATROPHIC VAGINITIS: ICD-10-CM

## 2022-09-01 DIAGNOSIS — Z01.419 WELL WOMAN EXAM WITH ROUTINE GYNECOLOGICAL EXAM: Primary | ICD-10-CM

## 2022-09-01 DIAGNOSIS — Z78.0 POST-MENOPAUSAL: ICD-10-CM

## 2022-09-01 DIAGNOSIS — Z12.31 SCREENING MAMMOGRAM, ENCOUNTER FOR: ICD-10-CM

## 2022-09-01 DIAGNOSIS — Z79.890 POSTMENOPAUSAL HORMONE REPLACEMENT THERAPY: ICD-10-CM

## 2022-09-01 PROCEDURE — 3079F PR MOST RECENT DIASTOLIC BLOOD PRESSURE 80-89 MM HG: ICD-10-PCS | Mod: CPTII,S$GLB,, | Performed by: OBSTETRICS & GYNECOLOGY

## 2022-09-01 PROCEDURE — 99999 PR PBB SHADOW E&M-EST. PATIENT-LVL III: CPT | Mod: PBBFAC,,, | Performed by: OBSTETRICS & GYNECOLOGY

## 2022-09-01 PROCEDURE — 99396 PREV VISIT EST AGE 40-64: CPT | Mod: S$GLB,,, | Performed by: OBSTETRICS & GYNECOLOGY

## 2022-09-01 PROCEDURE — 3008F PR BODY MASS INDEX (BMI) DOCUMENTED: ICD-10-PCS | Mod: CPTII,S$GLB,, | Performed by: OBSTETRICS & GYNECOLOGY

## 2022-09-01 PROCEDURE — 3074F SYST BP LT 130 MM HG: CPT | Mod: CPTII,S$GLB,, | Performed by: OBSTETRICS & GYNECOLOGY

## 2022-09-01 PROCEDURE — 1159F PR MEDICATION LIST DOCUMENTED IN MEDICAL RECORD: ICD-10-PCS | Mod: CPTII,S$GLB,, | Performed by: OBSTETRICS & GYNECOLOGY

## 2022-09-01 PROCEDURE — 99396 PR PREVENTIVE VISIT,EST,40-64: ICD-10-PCS | Mod: S$GLB,,, | Performed by: OBSTETRICS & GYNECOLOGY

## 2022-09-01 PROCEDURE — 88175 CYTOPATH C/V AUTO FLUID REDO: CPT | Performed by: OBSTETRICS & GYNECOLOGY

## 2022-09-01 PROCEDURE — 1159F MED LIST DOCD IN RCRD: CPT | Mod: CPTII,S$GLB,, | Performed by: OBSTETRICS & GYNECOLOGY

## 2022-09-01 PROCEDURE — 3074F PR MOST RECENT SYSTOLIC BLOOD PRESSURE < 130 MM HG: ICD-10-PCS | Mod: CPTII,S$GLB,, | Performed by: OBSTETRICS & GYNECOLOGY

## 2022-09-01 PROCEDURE — 3008F BODY MASS INDEX DOCD: CPT | Mod: CPTII,S$GLB,, | Performed by: OBSTETRICS & GYNECOLOGY

## 2022-09-01 PROCEDURE — 3079F DIAST BP 80-89 MM HG: CPT | Mod: CPTII,S$GLB,, | Performed by: OBSTETRICS & GYNECOLOGY

## 2022-09-01 PROCEDURE — 99999 PR PBB SHADOW E&M-EST. PATIENT-LVL III: ICD-10-PCS | Mod: PBBFAC,,, | Performed by: OBSTETRICS & GYNECOLOGY

## 2022-09-01 PROCEDURE — 87624 HPV HI-RISK TYP POOLED RSLT: CPT | Performed by: OBSTETRICS & GYNECOLOGY

## 2022-09-01 RX ORDER — ESTRADIOL 10 UG/1
INSERT VAGINAL
Qty: 30 TABLET | Refills: 6 | Status: SHIPPED | OUTPATIENT
Start: 2022-09-01 | End: 2023-04-25 | Stop reason: SDUPTHER

## 2022-09-01 NOTE — PROGRESS NOTES
Subjective:     Patient ID: Daksha Crabtree is a 61 y.o. female.     Chief Complaint: No chief complaint on file.     History of Present Illness: This patient is a 61 y.o.  female, who presents to the GYN clinic for her gyn well woman yearly exam.  She uses intravaginal estrogen dye all twice a week.  She denies gyn complaints      No LMP recorded. Patient has had a hysterectomy.    Review of Systems    GENERAL: No fever, chills, fatigability or weightchange  SKIN: No rashes, itching or changes in color or texture of skin.  HEAD: No headaches or recent head trauma.  EYES: Visual acuity fine. No photophobia,r diplopia.  EARS: Denies earache or vertigo  NOSE: No loss of smell, no epistaxis or postnasal drip.  MOUTH & THROAT: No hoarseness or change in voice.   NODES: Denies swollen glands.  CHEST: Denies HOLT, cyanosis, wheezing, cough and sputum production.  CARDIOVASCULAR: Denies chest pain, PND, orthopnea or reduced exercise tolerance.  ABDOMEN: Appetite fine. No weight loss. bloating, Denies diarrhea, abdominal pain, hematemesis or blood in stool.  URINARY: No flank pain, dysuria or hematuria.  PERIPHERAL VASCULAR: No claudication or cyanosis.Varicosities  MUSCULOSKELETAL: No joint stiffness or swelling. Denies back pain.muscle aches  NEUROLOGIC: No history of seizures, paralysis, alteration of gait or coordination.       Objective:       Physical Exam     APPEARANCE: Well nourished, well developed, in no acute distress.    GENITOURINARY:  Vulva: No lesions. Normal female genital architecture.  Urethral Meatus: Normal size and location, no lesions, no prolapse.  Urethra: No masses, tenderness, prolapse or scarring.  Vagina:  Moist with decreased rugae, no discharge, no significant cystocele or rectocele.  Cervix: No lesions, normal diameter, no stenosis, no cervical motion tenderness. .  Uterus: Absent  Adnexa: No masses, tenderness or CDS nodularity.  Anus Perineum: No lesions, no relaxation, no external  hemorrhoids.  Breasts: Symmetrical, no skin changes or visible lesions. No palpable masses, nipple discharge or adenopathy bilaterally.  Abdomen: No masses, tenderness, hernia or ascites, no hepatasplenomegaly  Neck: Supple. Symmetric without masses. No thyromegaly.  Skin: No rashes, lesions, ulcers, acne, hirsutism.  Respiratory: Breath sounds clear bilateraly. Good air movement. No rales. No wheezes.  Cardiovascular: Normal rate. Regular rhythm.  Peripheral/lower extremities: No edema, erythema or tenderness.  Lymphatic: No axillary, neck or groin nodes palp.  Mental Status: Alert, oriented x 3, normal affect and mood.          @PROCEDURE:@           Assessment:      1. Well woman exam with routine gynecological exam    2. Post-menopausal    3. Postmenopausal atrophic vaginitis    4. Screening mammogram, encounter for               Plan:  The patient had no questions concerning the pros, cons, risk and benefits of vaginal estrogen hormone replacement therapy.  Pap smear taken today.  Return to clinic p.r.n. symptoms or for well-woman exam.                  No orders of the defined types were placed in this encounter.

## 2022-10-17 ENCOUNTER — HOSPITAL ENCOUNTER (OUTPATIENT)
Dept: RADIOLOGY | Facility: HOSPITAL | Age: 62
Discharge: HOME | End: 2022-10-17
Attending: OBSTETRICS & GYNECOLOGY
Payer: COMMERCIAL

## 2022-10-17 VITALS — BODY MASS INDEX: 23.57 KG/M2 | WEIGHT: 155 LBS

## 2022-10-17 DIAGNOSIS — Z12.31 SCREENING MAMMOGRAM, ENCOUNTER FOR: ICD-10-CM

## 2022-10-17 PROCEDURE — 77063 MAMMO DIGITAL SCREENING BILAT WITH TOMO: ICD-10-PCS | Mod: 26,,, | Performed by: RADIOLOGY

## 2022-10-17 PROCEDURE — 77067 MAMMO DIGITAL SCREENING BILAT WITH TOMO: ICD-10-PCS | Mod: 26,,, | Performed by: RADIOLOGY

## 2022-10-17 PROCEDURE — 77063 BREAST TOMOSYNTHESIS BI: CPT | Mod: 26,,, | Performed by: RADIOLOGY

## 2022-10-17 PROCEDURE — 77067 SCR MAMMO BI INCL CAD: CPT | Mod: 26,,, | Performed by: RADIOLOGY

## 2022-10-17 PROCEDURE — 77063 BREAST TOMOSYNTHESIS BI: CPT | Mod: TC

## 2023-11-17 ENCOUNTER — PATIENT MESSAGE (OUTPATIENT)
Dept: OBSTETRICS AND GYNECOLOGY | Facility: CLINIC | Age: 63
End: 2023-11-17
Payer: COMMERCIAL

## 2023-11-17 ENCOUNTER — TELEPHONE (OUTPATIENT)
Dept: OBSTETRICS AND GYNECOLOGY | Facility: CLINIC | Age: 63
End: 2023-11-17
Payer: COMMERCIAL

## 2023-11-17 DIAGNOSIS — Z12.31 SCREENING MAMMOGRAM, ENCOUNTER FOR: Primary | ICD-10-CM

## 2023-12-27 ENCOUNTER — HOSPITAL ENCOUNTER (OUTPATIENT)
Dept: RADIOLOGY | Facility: HOSPITAL | Age: 63
Discharge: HOME | End: 2023-12-27
Attending: OBSTETRICS & GYNECOLOGY
Payer: COMMERCIAL

## 2023-12-27 DIAGNOSIS — Z12.31 SCREENING MAMMOGRAM, ENCOUNTER FOR: ICD-10-CM

## 2023-12-27 PROCEDURE — 77067 SCR MAMMO BI INCL CAD: CPT | Mod: TC

## 2023-12-27 PROCEDURE — 77063 MAMMO DIGITAL SCREENING BILAT WITH TOMO: ICD-10-PCS | Mod: 26,,, | Performed by: RADIOLOGY

## 2023-12-27 PROCEDURE — 77067 SCR MAMMO BI INCL CAD: CPT | Mod: 26,,, | Performed by: RADIOLOGY

## 2023-12-27 PROCEDURE — 77067 MAMMO DIGITAL SCREENING BILAT WITH TOMO: ICD-10-PCS | Mod: 26,,, | Performed by: RADIOLOGY

## 2023-12-27 PROCEDURE — 77063 BREAST TOMOSYNTHESIS BI: CPT | Mod: 26,,, | Performed by: RADIOLOGY

## 2024-01-05 ENCOUNTER — HOSPITAL ENCOUNTER (OUTPATIENT)
Dept: RADIOLOGY | Facility: HOSPITAL | Age: 64
Discharge: HOME | End: 2024-01-05
Payer: COMMERCIAL

## 2024-03-25 ENCOUNTER — OFFICE VISIT (OUTPATIENT)
Dept: OTOLARYNGOLOGY | Facility: CLINIC | Age: 64
End: 2024-03-25
Payer: COMMERCIAL

## 2024-03-25 DIAGNOSIS — M26.629 TMJPDS (TEMPOROMANDIBULAR JOINT PAIN DYSFUNCTION SYNDROME): ICD-10-CM

## 2024-03-25 DIAGNOSIS — R09.A2 GLOBUS SENSATION: ICD-10-CM

## 2024-03-25 DIAGNOSIS — R13.14 DYSPHAGIA, PHARYNGOESOPHAGEAL: ICD-10-CM

## 2024-03-25 DIAGNOSIS — R09.89 CHRONIC THROAT CLEARING: Primary | ICD-10-CM

## 2024-03-25 DIAGNOSIS — F41.9 ANXIETY: ICD-10-CM

## 2024-03-25 PROCEDURE — 99999 PR PBB SHADOW E&M-EST. PATIENT-LVL III: CPT | Mod: PBBFAC,,, | Performed by: OTOLARYNGOLOGY

## 2024-03-25 PROCEDURE — 99204 OFFICE O/P NEW MOD 45 MIN: CPT | Mod: 25,S$GLB,, | Performed by: OTOLARYNGOLOGY

## 2024-03-25 PROCEDURE — 31575 DIAGNOSTIC LARYNGOSCOPY: CPT | Mod: S$GLB,,, | Performed by: OTOLARYNGOLOGY

## 2024-03-25 NOTE — PROCEDURES
"Laryngoscopy    Date/Time: 3/25/2024 8:20 AM    Performed by: Keenan Hernández MD  Authorized by: Keenan Hernández MD    Time out: Immediately prior to procedure a "time out" was called to verify the correct patient, procedure, equipment, support staff and site/side marked as required.    Consent Done?:  Yes (Verbal)  Anesthesia:     Local anesthetic:  4% Xylocaine spray with Keith-Synephrine    Patient tolerance:  Patient tolerated the procedure well with no immediate complications    Decongestion performed?: Yes    Laryngoscopy:     Areas examined:  Nasal cavities, nasopharynx, oropharynx, hypopharynx, larynx and vocal cords  Larynx/hypopharynx:      Mild degree of interarytenoid and postcricoid mucosal redundancy/edema is noted.  There prominent lingual tonsils which are symmetric bilaterally and without any appreciable mucosal lesion.  There is some mild mucosal edema and mucus noted bilaterally on nasal examination but no purulence or polyps.  Status post adenoid ectomy and tonsillectomy with minimal septal spurring to the left side.    Minimal vocal strain some bilateral mild false vocal cord hyper compression with what appears to be full closure of mildly edematous bilateral true vocal cords without focal lesion or any appreciable asymmetry.  No pooling of secretions.    "

## 2024-03-25 NOTE — PROGRESS NOTES
[facility]    Subjective:      Patient: Daksha Crabtree Patient PCP: Lyn Douglas MD         :  1960     Sex:  female      MRN:  [id number]          Date of Visit: 2024      Chief Complaint: Other (Patient reports difficulty swallowing at times./RSI: 8) and Otalgia (Left )      Patient ID: Daksha Crabtree is a 63 y.o. female     Patient is a  former smoker of 10-15 pack years quitting 30+ years ago with a past medical history of anxiety without any regular use of anxiolytics only Ativan PRN and no history of reflux self-referred for globus sensation for 2 months.  No specific inciting event such as URI or an episode of choking etc..  Feels a sense of swelling and discomfort pointing to the lateral aspect of the upper thyroid cartilage which at times radiates up into the ear with an occasional sharp pain.  She feels some general tightness in the face as well pointing to both masseter hours.  The throat feels like there is a lump sensation pointing to this area pretty much all the time.  This is anxiety provoking when she feels like it is going to give her difficulty swallowing.  There has been no weight loss, subjective aspiration, or pneumonia.  No focal neurologic symptoms.  She feels like there is a visible swelling pointing to the area of the cricoid cartilage as well as to the xiphoid notch which seems to come and go.  Last TSH was in .  No esophagram.  RSI is minimally elevated at 8 with 3/5 for throat-clearing and 1/5 for hoarseness as well as 1/5 for difficulty swallowing with 0/5 for gross GERD symptoms.  No acid brash.    Labs:  WBC   Date Value Ref Range Status   2009 5.94 4.8 - 10.8 K/uL Final     Hemoglobin   Date Value Ref Range Status   2009 15.3 12.0 - 16.0 gm/dl Final     Platelets   Date Value Ref Range Status   2009 164 150 - 350 K/uL Final     Creatinine   Date Value Ref Range Status   2009 0.9 0.5 - 1.4 mg/dl Final     TSH   Date Value Ref Range  Status   02/10/2011 1.97 0.4 - 4.0 uIU/ml Final     Hemoglobin A1c   Date Value Ref Range Status   03/05/2021 4.9 <5.7 % of total Hgb Final     Comment:     For the purpose of screening for the presence of  diabetes:    <5.7%       Consistent with the absence of diabetes  5.7-6.4%    Consistent with increased risk for diabetes              (prediabetes)  > or =6.5%  Consistent with diabetes    This assay result is consistent with a decreased risk  of diabetes.    Currently, no consensus exists regarding use of  hemoglobin A1c for diagnosis of diabetes in children.    According to American Diabetes Association (ADA)  guidelines, hemoglobin A1c <7.0% represents optimal  control in non-pregnant diabetic patients. Different  metrics may apply to specific patient populations.   Standards of Medical Care in Diabetes(ADA).           Past Medical History  She has a past medical history of Anxiety, Breast disorder, and Dyspareunia.    Family / Surgical / Social History  Her family history includes Breast cancer (age of onset: 68) in her maternal aunt; Breast cancer (age of onset: 82) in her mother; Colon cancer in her maternal grandmother and paternal grandfather.    Past Surgical History:   Procedure Laterality Date    AUGMENTATION OF BREAST Bilateral     BREAST BIOPSY Left 2010    core    BREAST SURGERY Bilateral     CHOLECYSTECTOMY      HYSTERECTOMY      LASER LAPAROSCOPY      OOPHORECTOMY      Supracervical hysterectomy N/A     cervix  no uterus    TONSILLECTOMY         Social History     Tobacco Use    Smoking status: Former     Current packs/day: 0.50     Average packs/day: 0.5 packs/day for 10.0 years (5.0 ttl pk-yrs)     Types: Cigarettes    Smokeless tobacco: Never   Substance and Sexual Activity    Alcohol use: Yes     Alcohol/week: 15.0 standard drinks of alcohol     Types: 15 Glasses of wine per week     Comment: Soically    Drug use: No    Sexual activity: Yes     Partners: Male     Birth control/protection: See  "Surgical Hx       Medications  She has a current medication list which includes the following prescription(s): estradiol and lorazepam.      Allergies  Review of patient's allergies indicates:   Allergen Reactions    Sulfa (sulfonamide antibiotics) Rash       All medications, allergies, and past history have been reviewed.    Objective:      Vitals:      9/1/2022    11:45 AM 10/17/2022     2:27 PM 3/25/2024     8:28 AM   Vitals - 1 value per visit   SYSTOLIC 118     DIASTOLIC 80     Weight (lb) 151.46 155    Weight (kg) 68.7 70.308    Height 5' 8" (1.727 m)     BMI (Calculated) 23     Pain Score Zero  Zero       There is no height or weight on file to calculate BSA.    Physical Exam:    GENERAL  APPEARANCE -  alert, appears stated age, and cooperative  BARRIER(S) TO COMMUNICATION -  none VOICE - appropriate for age and gender    INTEGUMENTARY  no suspicious head and neck lesions    HEENT  HEAD: Normocephalic, without obvious abnormality, atraumatic  FACE: INSPECTION - Symmetric, no signs of trauma, no suspicious lesion(s)      STRENGTH - facial symmetry intact     PALPATION -  No masses     SALIVARY GLANDS - non-tender with no appreciable mass    NECK/THYROID: normal atraumatic, no neck masses, normal thyroid, no jvd    EYES  Normal occular alignment and mobility with no visible nystagmus at rest    EARS/NOSE/MOUTH/THROAT  EARS  PINNAE AND EXTERNAL EARS - no suspicious lesion OTOSCOPIC EXAM (surgical microscopy was not used for visualization/instrumentation): EAR EXAM - Normal ear canals, tympanic membranes and mobility, and middle ear spaces bilaterally.  HEARING - grossly intact to voice/finger rub    NOSE AND SINUSES  EXTERNAL NOSE - Grossly normal for age/sex  SEPTUM - normal/no obstruction on anterior exam without decongestion TURBINATES - within normal limits MUCOSA - within normal limits     MOUTH AND THROAT   ORAL CAVITY, LIPS, TEETH, GUMS & TONGUE - moist, no suspicious lesions  OROPHARYNX " /TONSILS/PHARYNGEAL WALLS/HYPOPHARYNX - no erythema or exudates  NASOPHARYNX - limited mirror exam - unable to visualize due to anatomy/gag  LARYNX -  - limited mirror exam - unable to visualize due to anatomy/gag      CHEST AND LUNG   INSPECTION & AUSCULTATION - normal effort, no stridor    CARDIOVASCULAR  AUSCULTATION & PERIPHERAL VASCULAR - regular rate and rhythm.    NEUROLOGIC  MENTAL STATUS - alert, interactive CRANIAL NERVES - normal    LYMPHATIC  HEAD AND NECK - non-palpable; SUPRACLAVICULAR - deferred      Procedure(s):  Flexible laryngoscopy performed.  See procedure note.                Assessment:      Problem List Items Addressed This Visit    None  Visit Diagnoses       Chronic throat clearing    -  Primary    Globus sensation        Anxiety        TMJPDS (temporomandibular joint pain dysfunction syndrome)        Dysphagia, pharyngoesophageal                     Plan:      Multifactorial throat-clearing and laryngeal irritation with nothing suspicious noted on an exam and endoscopy.  Underlying anxiety and panic disorder appears to be under treated as discussed.  This is probably the primary focus and area of greatest potential improvement in symptoms though the most challenging to complete.  See primary care for consideration of medication and/or psychiatry as well as psychology for talk therapy such as cognitive behavioral therapy which can be dramatically beneficial in controlling anxiety and secondary globus.      Conservative care of throat clearing avoidance as well as speech therapy to help reduce the irritation and perpetuation of muscular strain and swelling as discussed.      Reflux does not appear to be a significant contributor and acid suppressive medications not recommended but conservative care in the prevention of reflux is important and is outlined.      Barium esophagram with 14 mm tablet to assess the intermittent difficulty swallowing which might relate to some upper esophageal  sphincter muscular dysfunction.      Additional imaging such as contrast-enhanced CT imaging is only recommended if symptoms worsen or fail to improve.  If this is the case contact the office for this additional imaging order and follow-up to review.      Otherwise follow up as needed as long as symptoms are improving to resolving.        Voice recognition software was used in the creation of this note/communication and any sound-alike errors which may have occurred from its use should be taken in context when interpreting.  If such errors prevent a clear understanding of the note/communication, please contact the office for clarification.

## 2024-03-25 NOTE — PATIENT INSTRUCTIONS
Multifactorial throat-clearing and laryngeal irritation with nothing suspicious noted on an exam and endoscopy.  Underlying anxiety and panic disorder appears to be under treated as discussed.  This is probably the primary focus and area of greatest potential improvement in symptoms though the most challenging to complete.  See primary care for consideration of medication and/or psychiatry as well as psychology for talk therapy such as cognitive behavioral therapy which can be dramatically beneficial in controlling anxiety and secondary globus.      Conservative care of throat clearing avoidance as well as speech therapy to help reduce the irritation and perpetuation of muscular strain and swelling as discussed.      Reflux does not appear to be a significant contributor and acid suppressive medications not recommended but conservative care in the prevention of reflux is important and is outlined.      Barium esophagram with 14 mm tablet to assess the intermittent difficulty swallowing which might relate to some upper esophageal sphincter muscular dysfunction.      Additional imaging such as contrast-enhanced CT imaging is only recommended if symptoms worsen or fail to improve.  If this is the case contact the office for this additional imaging order and follow-up to review.      Otherwise follow up as needed as long as symptoms are improving to resolving.    THROAT CLEARING     Why am I clearing my throat so often?   Irritation of the vocal folds and surrounding area can cause the urge to clear your throat. This irritation can be caused by acid reflux, allergies, or environmental factors, as well as from a cold or sore throat. Talk with your doctor about the treatment of possible underlying causes. However, throat clearing can turn into a cycle. You clear your throat after feeling an irritation, which causes more irritation, which causes you to continue clearing your throat, which causes more irritation.     What  can I do about it?   Ask a friend or family member to help you keep track - you may not even realize how often you do it.   Replace the throat clearing with a different behavior.   Take a sip of water and then swallow. Repeat until the sensation subsides.  Swallow hard (even without water)   Use the silent throat clear method by making the h sound when you exhale  Breathe in deeply through your nose and exhale on shhh   Hum quietly   Keep yourself hydrated.   Drink plenty of water   Eat wet snacks (grapes, apples, melon, cucumber)   Use a humidifier at home or at work   Suck on hard candies, but avoid too much sugar and avoid anything with mint, menthol, camphor, or eucaplyptus, as these will have a more irritating effect.          WHAT TO EXPECT FROM VOICE THERAPY    Purpose  The purpose of voice therapy is to help you find a better, more efficient way to use your voice or to reduce symptoms such as coughing, throat clearing, or difficulty breathing.  Depending on your symptoms, you may learn how to produce clearer voice quality, how to reduce fatigue or pain associated with speaking, how to take care of your voice, and how to eliminate chronic coughing or throat clearing.      Process: Evaluation  First, you may go through some initial testing.  In most cases, a videostroboscopy will be performed in order to allow your speech pathologist and your physician to look at your vocal cords to aid in deciding if you would benefit from voice therapy.  Next, you may work with the speech pathologist to assess the current capabilities of your voice.  Following evaluation, your speech pathologist will design a therapeutic plan to improve your voice as well as other symptoms that may bother you.  At the time of evaluation, your speech pathologist may provide you with exercises to try at home.      Process: Therapy  Most patients benefit from 2-8 sessions over 1-3 months.  Voice therapy involves changing the behavior of  your vocal cords and speaking habits, so it is very important that you attend your appointments and do home practices as instructed by your speech pathologist.  Home practice and participation in therapy are critical to meeting your desired voice goals, so of course, we are able to work with you to schedule appointments that are convenient for you.          ACID REFLUX   What is acid reflux?    When we eat, food passes from the throat and into the stomach through a tube called the esophagus. At the bottom of the esophagus is a ring of muscles that acts as a valve between the esophagus and stomach, called the lower esophageal sphincter. Smoking, alcohol, and certain types of food may weaken the sphincter, so it may stop closing properly. The contents in the stomach then may leak back, or reflux, into the esophagus. This problem is called gastroesophageal reflux disease (GERD). Symptoms of GERD include heartburn, belching, regurgitation of stomach contents, and swallowing difficulties.    Sometimes, the stomach acid travels up through the esophagus and spills into the larynx or pharynx (voice box). This is called laryngopharyngeal reflux (LPR) and is irritating to the vocal folds and surrounding tissues. Often, patients with LPR do not experience heartburn as a symptom. More commonly, symptoms of LPR include hoarseness, excessive mucous resulting in frequent throat clearing, post-nasal drip, coughing, throat soreness or burning, choking episodes, difficulty swallowing, and sensation of a lump in the throat.     How is acid reflux treated?   Treatment for acid reflux can involve any combination of medication, lifestyle modifications, and surgery.   Medications. Your doctor may prescribe a proton pump inhibitor (PPI) or an H2 blocker. If you are prescribed a PPI, take in on an empty stomach in the morning 30 minutes prior to eating breakfast. Keep in mind that it may take 4-6 weeks before symptoms begin to resolve, so  do not stop medications without consulting your doctor.   Lifestyle and dietary modifications. Eat smaller meals at a slower pace. Avoid over-eating. If you are overweight, try to lose weight. Do not lie down or exercise directly after eating; eat your last meal of the day at least 2-3 hours prior to going to sleep. Avoid tight-fitting clothes. If you are a smoker, reduce or quit smoking. Elevate your head of bed 4-6 inches by putting phone books under the legs at the head of your bed or buy a wedge pillow, but do not use more than two regular pillows as this causes the body to curl and compresses your stomach.     Food group Foods to avoid to reduce reflux   Beverages  Whole milk, 2% milk, chocolate milk/hot chocolate, alcohol, coffee (regular and decaf), caffeinated tea, mint tea, carbonated beverages, citrus juice    Breads/grains Commercial sweet rolls, doughnuts, croissants, and other high-fat pastries    Fruits and vegetables Fried or cream-style vegetables, tomatoes, tomato-based products, citrus fruits, hot peppers    Soups and seasonings Cream, cheese, tomato-based soups, vinegar    Meats and proteins Fatty or fried meat/fish, murray, sausage, pepperoni, lunch meat, fried eggs    Fats and oil Lard, murray drippings, salt pork, meat drippings, gravies, highly seasoned salad dressings, nuts    Sweets/desserts Anything made with or from chocolate, peppermint, spearmint, whole milk, or cream; high-fat pastries, gum, hard candy

## 2024-04-09 ENCOUNTER — TELEPHONE (OUTPATIENT)
Dept: SPEECH THERAPY | Facility: HOSPITAL | Age: 64
End: 2024-04-09
Payer: COMMERCIAL

## 2024-04-09 NOTE — TELEPHONE ENCOUNTER
Corey pt to schedule eso & speech appt. 4/23@8am for radiology followed by speech appt. Will call if any issues with reappointing.

## 2024-04-23 ENCOUNTER — HOSPITAL ENCOUNTER (OUTPATIENT)
Dept: RADIOLOGY | Facility: HOSPITAL | Age: 64
Discharge: HOME | End: 2024-04-23
Attending: OTOLARYNGOLOGY
Payer: COMMERCIAL

## 2024-04-23 ENCOUNTER — CLINICAL SUPPORT (OUTPATIENT)
Dept: SPEECH THERAPY | Facility: HOSPITAL | Age: 64
End: 2024-04-23
Attending: OTOLARYNGOLOGY
Payer: COMMERCIAL

## 2024-04-23 DIAGNOSIS — R13.14 DYSPHAGIA, PHARYNGOESOPHAGEAL: ICD-10-CM

## 2024-04-23 DIAGNOSIS — R09.89 CHRONIC THROAT CLEARING: ICD-10-CM

## 2024-04-23 DIAGNOSIS — R09.A2 GLOBUS SENSATION: ICD-10-CM

## 2024-04-23 PROCEDURE — 74220 X-RAY XM ESOPHAGUS 1CNTRST: CPT | Mod: 26,,, | Performed by: RADIOLOGY

## 2024-04-23 PROCEDURE — A9698 NON-RAD CONTRAST MATERIALNOC: HCPCS | Performed by: OTOLARYNGOLOGY

## 2024-04-23 PROCEDURE — 92524 BEHAVRAL QUALIT ANALYS VOICE: CPT | Mod: GN | Performed by: SPEECH-LANGUAGE PATHOLOGIST

## 2024-04-23 PROCEDURE — 74220 X-RAY XM ESOPHAGUS 1CNTRST: CPT | Mod: TC

## 2024-04-23 PROCEDURE — 25500020 PHARM REV CODE 255: Performed by: OTOLARYNGOLOGY

## 2024-04-23 RX ADMIN — BARIUM SULFATE 150 ML: 0.6 SUSPENSION ORAL at 08:04

## 2024-04-23 NOTE — PROGRESS NOTES
Referring provider: Dr. Keenan Hernández  Reason for visit:  Behavioral and qualitative analysis of voice and resonance (CPT 71083)    Subjective / History    Daksha Crabtree is a 63 y.o. female referred for voice evaluation by Dr. Hernández.  She presents with complaints of some trouble swallowing, raspy, frog in her throat which began two months ago.  Patient also reported the following complaints/associated symptoms: clearing her throat, more frequently in the morning.  She reports morning time as exacerbating/triggering factors.  She reports later in the day as alleviating factors.  Sometimes when she gets nervous or anxious, she reports jaw tension and some globus.  Completed an esophagram earlier today.   -Description of the cough: staying the same  -Relevant environmental factors: none  -Patient endorses 'morning voice' and burning in throat  -ACE inhibitor history: none    -Swallowing: globus   -Breathing:  no c/o     -Smokin packs/day - quit 35 years ago  -Caffeine: 1-2 cups/day   -Water: plenty oz/day     Laryngoscopy findings (per Dr. Hernández on 3/25/24)  Minimal vocal strain some bilateral mild false vocal cord hyper compression with what appears to be full closure of mildly edematous bilateral true vocal cords without focal lesion or any appreciable asymmetry.  No pooling of secretions.     Past Medical History:   Diagnosis Date    Anxiety 03/15/2020    Breast disorder     Dyspareunia 2014     Current Outpatient Medications on File Prior to Visit   Medication Sig Dispense Refill    estradioL (VAGIFEM) 10 mcg Tab INSERT 1 TABLET VAGINALLY TWICE A WEEK 30 tablet 6    LORazepam (ATIVAN) 0.5 MG tablet Take 0.5 mg by mouth.       No current facility-administered medications on file prior to visit.        Objective    Perceptual/behavioral assessment  -CAPE-V Overall Score: 0  -Quality: appropriate for age and gender  -Volume: appropriate for age and gender identity  -Pitch: appropriate for age and  gender identity  -Flexibility: appropriate for age and gender identity  -Habitual respiratory pattern: chest/clavicular    Education / Stimulability Trials  Discussed importance of vocal hygiene including: hydration, reducing throat clearing, coughing, other phonotraumatic behaviors, and improving laryngeal environment by reducing contact with external/environmental cough-triggering factors. Assisted in training patient on antecedent sensations/behaviors which trigger the cough, which may include a frog in the throat.  In order to optimize laryngeal environment, discussed elimination of dry environments, and encouraged hydration and increasing ambient humidity. Patient was stimulable for participation in more efficient breathing and cough avoidance strategies, including nasal inhalation to humidify air and abduct vocal folds.  She was amenable to these suggestions.      Assessment     Patient presents with chronic throat clearing in the setting of a history of PND and anxiety per Dr. Hernández.      Recommendations / POC    Pt is only minimally bothered by these symptoms and is encouraged by the strategies provided during today's sessions.  Defer ongoing voice therapy at this time.  Encouraged her to contact me with any further questions and to continue strategies as discussed.

## 2024-12-30 ENCOUNTER — PATIENT MESSAGE (OUTPATIENT)
Dept: OBSTETRICS AND GYNECOLOGY | Facility: CLINIC | Age: 64
End: 2024-12-30
Payer: COMMERCIAL

## 2024-12-30 DIAGNOSIS — Z12.31 SCREENING MAMMOGRAM, ENCOUNTER FOR: Primary | ICD-10-CM

## 2025-01-28 ENCOUNTER — HOSPITAL ENCOUNTER (OUTPATIENT)
Dept: RADIOLOGY | Facility: HOSPITAL | Age: 65
Discharge: HOME | End: 2025-01-28
Attending: OBSTETRICS & GYNECOLOGY
Payer: COMMERCIAL

## 2025-01-28 VITALS — BODY MASS INDEX: 24.33 KG/M2 | WEIGHT: 160 LBS

## 2025-01-28 DIAGNOSIS — Z12.31 SCREENING MAMMOGRAM, ENCOUNTER FOR: ICD-10-CM

## 2025-01-28 PROCEDURE — 77063 BREAST TOMOSYNTHESIS BI: CPT | Mod: TC

## 2025-01-28 PROCEDURE — 77067 SCR MAMMO BI INCL CAD: CPT | Mod: 26,,, | Performed by: RADIOLOGY

## 2025-01-28 PROCEDURE — 77063 BREAST TOMOSYNTHESIS BI: CPT | Mod: 26,,, | Performed by: RADIOLOGY

## 2025-01-29 ENCOUNTER — OFFICE VISIT (OUTPATIENT)
Dept: OBSTETRICS AND GYNECOLOGY | Facility: CLINIC | Age: 65
End: 2025-01-29
Payer: COMMERCIAL

## 2025-01-29 VITALS
DIASTOLIC BLOOD PRESSURE: 80 MMHG | HEIGHT: 68 IN | BODY MASS INDEX: 23.82 KG/M2 | SYSTOLIC BLOOD PRESSURE: 122 MMHG | WEIGHT: 157.19 LBS

## 2025-01-29 DIAGNOSIS — N95.2 POSTMENOPAUSAL ATROPHIC VAGINITIS: ICD-10-CM

## 2025-01-29 DIAGNOSIS — Z78.0 POST-MENOPAUSAL: ICD-10-CM

## 2025-01-29 DIAGNOSIS — Z01.419 WELL WOMAN EXAM WITH ROUTINE GYNECOLOGICAL EXAM: Primary | ICD-10-CM

## 2025-01-29 PROCEDURE — 3008F BODY MASS INDEX DOCD: CPT | Mod: CPTII,S$GLB,, | Performed by: OBSTETRICS & GYNECOLOGY

## 2025-01-29 PROCEDURE — 3074F SYST BP LT 130 MM HG: CPT | Mod: CPTII,S$GLB,, | Performed by: OBSTETRICS & GYNECOLOGY

## 2025-01-29 PROCEDURE — 99999 PR PBB SHADOW E&M-EST. PATIENT-LVL III: CPT | Mod: PBBFAC,,, | Performed by: OBSTETRICS & GYNECOLOGY

## 2025-01-29 PROCEDURE — 3079F DIAST BP 80-89 MM HG: CPT | Mod: CPTII,S$GLB,, | Performed by: OBSTETRICS & GYNECOLOGY

## 2025-01-29 PROCEDURE — 1159F MED LIST DOCD IN RCRD: CPT | Mod: CPTII,S$GLB,, | Performed by: OBSTETRICS & GYNECOLOGY

## 2025-01-29 PROCEDURE — 99396 PREV VISIT EST AGE 40-64: CPT | Mod: S$GLB,,, | Performed by: OBSTETRICS & GYNECOLOGY

## 2025-01-29 PROCEDURE — 88175 CYTOPATH C/V AUTO FLUID REDO: CPT | Performed by: OBSTETRICS & GYNECOLOGY

## 2025-01-29 NOTE — PROGRESS NOTES
Subjective:     Patient ID: Daksha Crabtree is a 64 y.o. female.     Chief Complaint: No chief complaint on file.     History of Present Illness: This patient is a 64 y.o.  female, who presents to the GYN clinic for her gyn well woman yearly exam.  She denies gyn complaints.  She is using Vagifem weekly. She had a mammogram done on 1/28/25 which was reported negative.      No LMP recorded. Patient has had a hysterectomy.Supracervical.    Review of Systems    GENERAL: No fever, chills, fatigability or weightchange  SKIN: No rashes, itching or changes in color or texture of skin.  HEAD: No headaches or recent head trauma.  EYES: Visual acuity fine. No photophobia,r diplopia.  EARS: Denies earache or vertigo  NOSE: No loss of smell, no epistaxis or postnasal drip.  MOUTH & THROAT: No hoarseness or change in voice.   NODES: Denies swollen glands.  CHEST: Denies HOLT, cyanosis, wheezing, cough and sputum production.  CARDIOVASCULAR: Denies chest pain, PND, orthopnea or reduced exercise tolerance.  ABDOMEN: Appetite fine. No weight loss. bloating, Denies diarrhea, abdominal pain, hematemesis or blood in stool.  URINARY: No flank pain, dysuria or hematuria.  PERIPHERAL VASCULAR: No claudication or cyanosis.Varicosities  MUSCULOSKELETAL: No joint stiffness or swelling. Denies back pain.muscle aches  NEUROLOGIC: No history of seizures, paralysis, alteration of gait or coordination.       Objective:       Physical Exam     APPEARANCE: Well nourished, well developed, in no acute distress.    GENITOURINARY:  Vulva: No lesions. Normal female genital architecture.  Urethral Meatus: Normal size and location, no lesions, no prolapse.  Urethra: No masses, tenderness, prolapse or scarring.  Vagina: Moist, with decreased rugae, no discharge, no significant cystocele or rectocele.  Cervix:  No lesions normal diameter no cyanosis no cervical motion tenderness  Uterus: Absent  Adnexa: No masses, tenderness or CDS nodularity.  Anus  Perineum: No lesions, no relaxation, no external hemorrhoids.  Breasts: Symmetrical, no skin changes or visible lesions. No palpable masses, nipple discharge or adenopathy bilaterally.  Abdomen: No masses, tenderness, hernia or ascites, no hepatasplenomegaly  Neck: Supple. Symmetric without masses. No thyromegaly.  Skin: No rashes, lesions, ulcers, acne, hirsutism.  Respiratory: Breath sounds clear bilateraly. Good air movement. No rales. No wheezes.  Cardiovascular: Normal rate. Regular rhythm.  Peripheral/lower extremities: No edema, erythema or tenderness.  Lymphatic: No axillary, neck or groin nodes palp.  Mental Status: Alert, oriented x 3, normal affect and mood.    @PROCEDURE:@           Assessment:      1. Well woman exam with routine gynecological exam    2. Post-menopausal    3. Postmenopausal atrophic vaginitis               Plan:  Pap smear taken today per patient request.  Return to clinic p.r.n. symptoms or for well-woman exam.              No orders of the defined types were placed in this encounter.

## 2025-02-03 LAB
FINAL PATHOLOGIC DIAGNOSIS: NORMAL
Lab: NORMAL

## 2025-02-05 ENCOUNTER — PATIENT MESSAGE (OUTPATIENT)
Dept: OBSTETRICS AND GYNECOLOGY | Facility: CLINIC | Age: 65
End: 2025-02-05
Payer: COMMERCIAL